# Patient Record
Sex: FEMALE | Race: WHITE | NOT HISPANIC OR LATINO | Employment: FULL TIME | URBAN - METROPOLITAN AREA
[De-identification: names, ages, dates, MRNs, and addresses within clinical notes are randomized per-mention and may not be internally consistent; named-entity substitution may affect disease eponyms.]

---

## 2018-10-25 ENCOUNTER — OFFICE VISIT CONVERTED (OUTPATIENT)
Dept: FAMILY MEDICINE CLINIC | Facility: CLINIC | Age: 53
End: 2018-10-25
Attending: FAMILY MEDICINE

## 2019-05-06 ENCOUNTER — OFFICE VISIT CONVERTED (OUTPATIENT)
Dept: FAMILY MEDICINE CLINIC | Facility: CLINIC | Age: 54
End: 2019-05-06
Attending: FAMILY MEDICINE

## 2019-05-07 ENCOUNTER — HOSPITAL ENCOUNTER (OUTPATIENT)
Dept: FAMILY MEDICINE CLINIC | Facility: CLINIC | Age: 54
Discharge: HOME | End: 2019-05-07
Attending: FAMILY MEDICINE

## 2019-05-07 LAB
BASOPHILS # BLD AUTO: 0.03 10*3/UL (ref 0–0.2)
BASOPHILS NFR BLD AUTO: 0.5 % (ref 0–3)
CONV ABS IMM GRAN: 0.02 10*3/UL (ref 0–0.2)
CONV IMMATURE GRAN: 0.3 % (ref 0–1.8)
DEPRECATED RDW RBC AUTO: 45.3 FL (ref 36.4–46.3)
EOSINOPHIL # BLD AUTO: 0.1 10*3/UL (ref 0–0.7)
EOSINOPHIL # BLD AUTO: 1.7 % (ref 0–7)
ERYTHROCYTE [DISTWIDTH] IN BLOOD BY AUTOMATED COUNT: 13.2 % (ref 11.7–14.4)
HBA1C MFR BLD: 12.6 G/DL (ref 12–16)
HCT VFR BLD AUTO: 40.2 % (ref 37–47)
LYMPHOCYTES # BLD AUTO: 1.56 10*3/UL (ref 1–5)
MCH RBC QN AUTO: 29.6 PG (ref 27–31)
MCHC RBC AUTO-ENTMCNC: 31.3 G/DL (ref 33–37)
MCV RBC AUTO: 94.4 FL (ref 81–99)
MONOCYTES # BLD AUTO: 0.48 10*3/UL (ref 0.2–1.2)
MONOCYTES NFR BLD AUTO: 8.1 % (ref 3–10)
NEUTROPHILS # BLD AUTO: 3.72 10*3/UL (ref 2–8)
NEUTROPHILS NFR BLD AUTO: 63 % (ref 30–85)
NRBC CBCN: 0 % (ref 0–0.7)
PLATELET # BLD AUTO: 210 10*3/UL (ref 130–400)
PMV BLD AUTO: 11.7 FL (ref 9.4–12.3)
RBC # BLD AUTO: 4.26 10*6/UL (ref 4.2–5.4)
VARIANT LYMPHS NFR BLD MANUAL: 26.4 % (ref 20–45)
WBC # BLD AUTO: 5.91 10*3/UL (ref 4.8–10.8)

## 2019-05-08 LAB
ALBUMIN SERPL-MCNC: 4.3 G/DL (ref 3.5–5)
ALBUMIN/GLOB SERPL: 1.5 {RATIO} (ref 1.4–2.6)
ALP SERPL-CCNC: 71 U/L (ref 53–141)
ALT SERPL-CCNC: 27 U/L (ref 10–40)
ANION GAP SERPL CALC-SCNC: 20 MMOL/L (ref 8–19)
AST SERPL-CCNC: 35 U/L (ref 15–50)
BILIRUB SERPL-MCNC: 0.52 MG/DL (ref 0.2–1.3)
BUN SERPL-MCNC: 8 MG/DL (ref 5–25)
BUN/CREAT SERPL: 11 {RATIO} (ref 6–20)
CALCIUM SERPL-MCNC: 9.4 MG/DL (ref 8.7–10.4)
CHLORIDE SERPL-SCNC: 97 MMOL/L (ref 99–111)
CHOLEST SERPL-MCNC: 130 MG/DL (ref 107–200)
CHOLEST/HDLC SERPL: 3.2 {RATIO} (ref 3–6)
CONV CO2: 26 MMOL/L (ref 22–32)
CONV TOTAL PROTEIN: 7.2 G/DL (ref 6.3–8.2)
CREAT UR-MCNC: 0.72 MG/DL (ref 0.5–0.9)
EST. AVERAGE GLUCOSE BLD GHB EST-MCNC: 111 MG/DL
GFR SERPLBLD BASED ON 1.73 SQ M-ARVRAT: >60 ML/MIN/{1.73_M2}
GLOBULIN UR ELPH-MCNC: 2.9 G/DL (ref 2–3.5)
GLUCOSE SERPL-MCNC: 94 MG/DL (ref 65–99)
HBA1C MFR BLD: 5.5 % (ref 3.5–5.7)
HDLC SERPL-MCNC: 41 MG/DL (ref 40–60)
LDLC SERPL CALC-MCNC: 53 MG/DL (ref 70–100)
OSMOLALITY SERPL CALC.SUM OF ELEC: 284 MOSM/KG (ref 273–304)
POTASSIUM SERPL-SCNC: 4.5 MMOL/L (ref 3.5–5.3)
SODIUM SERPL-SCNC: 138 MMOL/L (ref 135–147)
T4 FREE SERPL-MCNC: 1 NG/DL (ref 0.9–1.8)
TRIGL SERPL-MCNC: 180 MG/DL (ref 40–150)
TSH SERPL-ACNC: 2.25 M[IU]/L (ref 0.27–4.2)
VLDLC SERPL-MCNC: 36 MG/DL (ref 5–37)

## 2019-10-02 ENCOUNTER — CONVERSION ENCOUNTER (OUTPATIENT)
Dept: FAMILY MEDICINE CLINIC | Facility: CLINIC | Age: 54
End: 2019-10-02

## 2019-10-02 ENCOUNTER — OFFICE VISIT CONVERTED (OUTPATIENT)
Dept: FAMILY MEDICINE CLINIC | Facility: CLINIC | Age: 54
End: 2019-10-02
Attending: NURSE PRACTITIONER

## 2019-11-06 ENCOUNTER — OFFICE VISIT CONVERTED (OUTPATIENT)
Dept: FAMILY MEDICINE CLINIC | Facility: CLINIC | Age: 54
End: 2019-11-06
Attending: FAMILY MEDICINE

## 2019-12-09 ENCOUNTER — OFFICE VISIT CONVERTED (OUTPATIENT)
Dept: FAMILY MEDICINE CLINIC | Facility: CLINIC | Age: 54
End: 2019-12-09
Attending: FAMILY MEDICINE

## 2019-12-09 ENCOUNTER — CONVERSION ENCOUNTER (OUTPATIENT)
Dept: FAMILY MEDICINE CLINIC | Facility: CLINIC | Age: 54
End: 2019-12-09

## 2019-12-13 ENCOUNTER — HOSPITAL ENCOUNTER (OUTPATIENT)
Dept: URGENT CARE | Facility: CLINIC | Age: 54
Discharge: HOME | End: 2019-12-13
Attending: EMERGENCY MEDICINE

## 2020-02-06 ENCOUNTER — OFFICE VISIT CONVERTED (OUTPATIENT)
Dept: FAMILY MEDICINE CLINIC | Facility: CLINIC | Age: 55
End: 2020-02-06
Attending: FAMILY MEDICINE

## 2020-05-21 ENCOUNTER — OFFICE VISIT CONVERTED (OUTPATIENT)
Dept: FAMILY MEDICINE CLINIC | Facility: CLINIC | Age: 55
End: 2020-05-21
Attending: FAMILY MEDICINE

## 2020-05-21 ENCOUNTER — HOSPITAL ENCOUNTER (OUTPATIENT)
Dept: FAMILY MEDICINE CLINIC | Facility: CLINIC | Age: 55
Discharge: HOME | End: 2020-05-21
Attending: FAMILY MEDICINE

## 2020-08-25 ENCOUNTER — OFFICE VISIT CONVERTED (OUTPATIENT)
Dept: FAMILY MEDICINE CLINIC | Facility: CLINIC | Age: 55
End: 2020-08-25
Attending: FAMILY MEDICINE

## 2021-05-13 NOTE — PROGRESS NOTES
Progress Note      Patient Name: Neda Bah   Patient ID: [id number]   Sex: Female   YOB: 1965    Primary Care Provider: Osmani Mays DO   Referring Provider: Osmani Mays DO    Visit Date: August 25, 2020    Provider: Osmani Mays DO   Location: [facility]   Location Address: [address] [place] [address], [address]  [id number]   Location Phone: [phone]          Chief Complaint  · check up      History Of Present Illness  Neda Bah is a 54 year old /White female who presents for evaluation and treatment of:      Patient presents for 3-month checkup.  She has lost 2 pounds since I last saw her.  She is being treated for obesity.  BMI has continued to improve down to 28.7.  Her BMI when she first started treatment was 34.27.  She is still exercising regularly.  Her bunions have been bothering her more more so on the right side than the left side.  She is requesting referral to podiatry.  She has lost a total of 28 pounds.  Heart rate slightly elevated today.  She denies any palpitations or racing heart rate.  She is requesting a refill of phentermine.  Her Roman, UDS, and controlled substance agreement have been reviewed and they are appropriate.       Past Medical History  Allergies; Breast cancer screening; Cervical cancer screening; Colon cancer screening; Hemorrhoids; Hyperlipidemia; Hypertension; Reflux Disease; Screening Mammogram; Vasomotor symptoms due to menopause         Past Surgical History  Colonoscopy; Diverticulitis surgery; Hysterectomy         Medication List  atorvastatin 40 mg oral tablet; cimetidine 400 mg oral tablet; Flonase Allergy Relief 50 mcg/actuation nasal spray,suspension; lisinopril 10 mg oral tablet; phentermine 37.5 mg oral tablet; valacyclovir 500 mg oral tablet; Zyrtec 10 mg oral tablet         Allergy List  NO KNOWN DRUG ALLERGIES       Allergies Reconciled  Family Medical History  Liver Neoplasm, Malignant; Lung cancer;  Alcoholism in family; No family history of colorectal cancer         Social History  Alcohol (Unknown); Family History of Substance Use/Abuse; Tobacco (Former)         Immunizations  Name Date Admin   Influenza          Review of Systems     General: Denies any fever, chills.  Weight loss as discussed above  HEENT:  Denies any vision or hearing changes. Denies any neck tenderness. Denies any headaches. Denies nasal congestion  Cardiovascular: Denies any chest pain or palpitations  respiratory: Denies any cough or wheezing. Denies any shortness of breath  Gastrointestinal: Denies any nausea vomiting or diarrhea, Denies constipation  Extremities: Denies any edema  Psychiatric: Denies any changes in mood or affect  Neurologic: Denies any neurologic deficits  skin: Denies any rashes or lesions.  Musculoskeletal: As discussed above       Vitals  Date Time BP Position Site L\R Cuff Size HR RR TEMP (F) WT  HT  BMI kg/m2 BSA m2 O2 Sat HC       08/25/2020 02:42 /82 Sitting    104 - R  97.5 146lbs 16oz 5'   28.71 1.68 99 %          Physical Examination     General: AAO 3, no acute distress, pleasant  HEENT: Normocephalic, atraumatic  Cardiovascular: Regular rate and rhythm without appreciable murmur  Respiratory: Clear to auscultation bilaterally no RRW  Gastrointestinal: Soft nontender nondistended with bowel sounds present  extremities: No clubbing, cyanosis or edema  Neurologic: CN II through XII grossly intact   Psychiatric: Normal mood and affect  Musculoskeletal: Bunions noted bilaterally more prominent on the right than the left.  Nontender to palpation.           Assessment  · Bunion, left     727.1/M21.612  · Bunion, right     727.1/M21.611  · BMI 28.0-28.9,adult     V85.24/Z68.28    Problems Reconciled  Plan  · Orders  o ACO-39: Current medications updated and reviewed () - - 08/25/2020  o PODIATRY CONSULTATION (PODIA) - 727.1/M21.612, 727.1/M21.611 - 08/25/2020   Please evaluate and  treat  · Medications  o phentermine 37.5 mg oral tablet   SIG: take 1 tablet (37.5 mg) by oral route once daily before breakfast for 30 days   DISP: (30) tablets with 2 refills  Refilled on 08/25/2020     o Medications have been Reconciled  o Transition of Care or Provider Policy  · Instructions  o Discussed the risk and benefits of the use of controlled substances with the patient, including the risk of tolerance and drug dependence. The patient has been counseled on the need to have an exit strategy, including potentially discontinuing the use of controlled substances.   o Patient was educated/instructed on their diagnosis, treatment and medications prior to discharge from the clinic today.  o Patient instructed to seek medical attention urgently for new or worsening symptoms.  o Call the office with any concerns or questions.  o Discussed continue current weight management plan with phentermine. Roman, UDS, and controlled substance agreement have been reviewed and they are appropriate. I will see her back in 3 months. I will make referral to podiatry. Patient encouraged to continue doing diet and exercise in addition to phentermine. In November she will been on treatment for 1 year.  · Disposition  o Follow Up in 3 months.            Electronically Signed by: Osmani Mays, DO -Author on August 25, 2020 04:33:25 PM

## 2021-05-13 NOTE — PROGRESS NOTES
Progress Note      Patient Name: Neda Bah   Patient ID: [id number]   Sex: Female   YOB: 1965    Primary Care Provider: Osmani Mays DO   Referring Provider: Osmani Mays DO    Visit Date: May 21, 2020    Provider: Osmani Mays DO   Location: [facility]   Location Address: [address] [place] [address], [address]  [id number]   Location Phone: [phone]          Chief Complaint  · check up      History Of Present Illness  Neda Bah is a 54 year old /White female who presents for evaluation and treatment of:      Patient presents today for follow-up.  I have been tracking her weight loss.  She has been on phentermine for treatment of obesity.  She has lost another couple pounds.  During the coronavirus pandemic it was difficult for her to work out as a gyms were close but she is back at it now.  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  She does not need a refill of phentermine today.  She has been on treatment since November 2019.  She does have acid reflux and is requesting another medication besides Zantac as this has been removed from the market.  I will switch her to cimetidine.  Her blood pressure is elevated today as well as heart rate.  She admits to being aggravated while at work just before coming over here.  Her blood pressure otherwise has been running normal with normal heart rate at home.  Discussed having her monitor for now and if blood pressure continues to remain elevated she is to return as this can potentially be a side effect of phentermine.  She verbalized understanding.       Past Medical History  Allergies; Breast cancer screening; Cervical cancer screening; Colon cancer screening; Hemorrhoids; Hyperlipidemia; Hypertension; Reflux Disease; Screening Mammogram; Vasomotor symptoms due to menopause         Past Surgical History  Colonoscopy; Diverticulitis surgery; Hysterectomy         Medication  List  atorvastatin 40 mg oral tablet; Flonase Allergy Relief 50 mcg/actuation nasal spray,suspension; lisinopril 10 mg oral tablet; phentermine 37.5 mg oral tablet; Zyrtec 10 mg oral tablet         Allergy List  NO KNOWN DRUG ALLERGIES         Family Medical History  Liver Neoplasm, Malignant; Lung cancer; Alcoholism in family; No family history of colorectal cancer         Social History  Alcohol (Unknown); Family History of Substance Use/Abuse; Tobacco (Former)         Immunizations  Name Date Admin   Influenza          Review of Systems     General: Denies any fever, chills.  Slight weight loss of 2 pounds since last visit.  Overall she has lost 26 pounds since starting phentermine.  HEENT:  Denies any vision or hearing changes. Denies any neck tenderness. Denies any headaches. Denies nasal congestion.  She was previously diagnosed with herpes in her eye on the right.  She was seen by ophthalmology and placed on valacyclovir.  She is requesting a refill for suppressive treatment 500 mg daily.  Cardiovascular: Denies any chest pain or palpitations  respiratory: Denies any cough or wheezing. Denies any shortness of breath  Gastrointestinal: Denies any nausea vomiting or diarrhea, Denies constipation  Extremities: Denies any edema  Psychiatric: Denies any changes in mood or affect  Neurologic: Denies any neurologic deficits  skin: Denies any rashes or lesions.  endocrine: Denies any weight loss, fever, night sweats  Musculoskeletal: Denies any weakness       Vitals  Date Time BP Position Site L\R Cuff Size HR RR TEMP (F) WT  HT  BMI kg/m2 BSA m2 O2 Sat        05/21/2020 12:56 /98 Sitting    110 - R  98 149lbs 2oz 5'   29.12 1.69 99 %          Physical Examination     General: AAO 3, no acute distress, pleasant, overweight  HEENT: Normocephalic, atraumatic  Cardiovascular: Regular rate and rhythm without appreciable murmur  Respiratory: Clear to auscultation bilaterally no RRW  Gastrointestinal: Soft  nontender nondistended with bowel sounds present  extremities: No clubbing, cyanosis or edema  Neurologic: CN II through XII grossly intact   Psychiatric: Normal mood and affect           Assessment  · GERD (gastroesophageal reflux disease)     530.81/K21.9  · BMI 29.0-29.9,adult     V85.25/Z68.29  · Medication monitoring encounter     V58.83/Z51.81    Problems Reconciled  Plan  · Orders  o ACO-39: Current medications updated and reviewed () - - 05/21/2020  · Medications  o cimetidine 400 mg oral tablet   SIG: take 1 tablet (400 mg) by oral route once daily at bedtime for 90 days   DISP: (90) tablets with 3 refills  Prescribed on 05/21/2020     o valacyclovir 500 mg oral tablet   SIG: take 1 tablet (500 mg) by oral route once daily for 90 days   DISP: (90) tablets with 3 refills  Prescribed on 05/21/2020     o Medications have been Reconciled  o Transition of Care or Provider Policy  · Instructions  o Patient was educated/instructed on their diagnosis, treatment and medications prior to discharge from the clinic today.  o Patient instructed to seek medical attention urgently for new or worsening symptoms.  o Call the office with any concerns or questions.  o I will switch her to cimetidine. I will also give her prescription for valacyclovir. Plan on seeing patient back in 3 months for checkup. She is to call with any questions or concerns. She is not requesting a refill of phentermine today. We will continue with current management and make changes if needed if her blood pressure remains elevated. She has had normotensive levels since starting phentermine.  · Disposition  o Follow Up in 3 months.            Electronically Signed by: Osmani Mays DO -Author on May 21, 2020 01:14:17 PM

## 2021-05-14 VITALS
OXYGEN SATURATION: 99 % | SYSTOLIC BLOOD PRESSURE: 134 MMHG | DIASTOLIC BLOOD PRESSURE: 82 MMHG | TEMPERATURE: 97.5 F | BODY MASS INDEX: 28.86 KG/M2 | HEIGHT: 60 IN | HEART RATE: 104 BPM | WEIGHT: 147 LBS

## 2021-05-15 VITALS
OXYGEN SATURATION: 99 % | TEMPERATURE: 97.9 F | HEART RATE: 83 BPM | SYSTOLIC BLOOD PRESSURE: 124 MMHG | BODY MASS INDEX: 29.7 KG/M2 | HEIGHT: 60 IN | DIASTOLIC BLOOD PRESSURE: 78 MMHG | WEIGHT: 151.25 LBS

## 2021-05-15 VITALS
BODY MASS INDEX: 34.26 KG/M2 | WEIGHT: 174.5 LBS | SYSTOLIC BLOOD PRESSURE: 124 MMHG | OXYGEN SATURATION: 97 % | HEIGHT: 60 IN | HEART RATE: 97 BPM | DIASTOLIC BLOOD PRESSURE: 78 MMHG | TEMPERATURE: 97.9 F

## 2021-05-15 VITALS
OXYGEN SATURATION: 97 % | HEIGHT: 60 IN | SYSTOLIC BLOOD PRESSURE: 112 MMHG | TEMPERATURE: 98.4 F | DIASTOLIC BLOOD PRESSURE: 78 MMHG | RESPIRATION RATE: 16 BRPM | WEIGHT: 163.5 LBS | HEART RATE: 101 BPM | BODY MASS INDEX: 32.1 KG/M2

## 2021-05-15 VITALS
DIASTOLIC BLOOD PRESSURE: 98 MMHG | BODY MASS INDEX: 29.28 KG/M2 | WEIGHT: 149.12 LBS | HEIGHT: 60 IN | SYSTOLIC BLOOD PRESSURE: 160 MMHG | TEMPERATURE: 98 F | HEART RATE: 110 BPM | OXYGEN SATURATION: 99 %

## 2021-05-15 VITALS
HEART RATE: 70 BPM | WEIGHT: 175.5 LBS | TEMPERATURE: 98 F | HEIGHT: 60 IN | DIASTOLIC BLOOD PRESSURE: 78 MMHG | BODY MASS INDEX: 34.46 KG/M2 | SYSTOLIC BLOOD PRESSURE: 122 MMHG | OXYGEN SATURATION: 96 %

## 2021-05-15 VITALS
BODY MASS INDEX: 35.14 KG/M2 | TEMPERATURE: 97.9 F | WEIGHT: 179 LBS | HEART RATE: 87 BPM | HEIGHT: 60 IN | DIASTOLIC BLOOD PRESSURE: 80 MMHG | SYSTOLIC BLOOD PRESSURE: 112 MMHG

## 2021-05-16 VITALS
HEIGHT: 60 IN | HEART RATE: 87 BPM | BODY MASS INDEX: 35.53 KG/M2 | SYSTOLIC BLOOD PRESSURE: 122 MMHG | WEIGHT: 181 LBS | DIASTOLIC BLOOD PRESSURE: 88 MMHG | TEMPERATURE: 98.5 F | OXYGEN SATURATION: 97 %

## 2021-07-12 ENCOUNTER — TELEPHONE (OUTPATIENT)
Dept: FAMILY MEDICINE CLINIC | Facility: CLINIC | Age: 56
End: 2021-07-12

## 2021-07-12 RX ORDER — ATORVASTATIN CALCIUM 40 MG/1
TABLET, FILM COATED ORAL
COMMUNITY
Start: 2021-04-29 | End: 2022-02-02 | Stop reason: SDUPTHER

## 2021-07-12 RX ORDER — CETIRIZINE HYDROCHLORIDE 10 MG/1
TABLET ORAL
COMMUNITY
Start: 2021-02-01 | End: 2022-05-02 | Stop reason: SDUPTHER

## 2021-07-12 RX ORDER — LISINOPRIL 10 MG/1
TABLET ORAL
COMMUNITY
Start: 2021-04-29 | End: 2022-03-02 | Stop reason: SDUPTHER

## 2021-07-12 RX ORDER — LEVOTHYROXINE SODIUM 0.03 MG/1
TABLET ORAL
COMMUNITY
End: 2022-02-02

## 2021-07-12 RX ORDER — VALACYCLOVIR HYDROCHLORIDE 500 MG/1
TABLET, FILM COATED ORAL
COMMUNITY
Start: 2021-05-19 | End: 2022-04-11 | Stop reason: SDUPTHER

## 2021-07-12 RX ORDER — CIMETIDINE 400 MG/1
TABLET, FILM COATED ORAL
Qty: 90 TABLET | Refills: 3 | Status: SHIPPED | OUTPATIENT
Start: 2021-07-12 | End: 2022-04-11 | Stop reason: SDUPTHER

## 2021-07-12 NOTE — TELEPHONE ENCOUNTER
Caller: Neda Bah    Relationship: Self    Best call back number: [phone]    What medication are you requesting:   CIMETIDINE 400 MG    Have you had these symptoms before:    [x] Yes  [] No    Have you been treated for these symptoms before:   [x] Yes  [] No    If a prescription is needed, what is your preferred pharmacy and phone number:    [facility]  [address], [place]     Additional notes: NO MED LIST ON FILE, PATIENT HAS FOUR DAYS LEFT. PLEASE CALL PATIENT IF SHE NEEDS AN APPT FOR REFILLS.

## 2022-01-31 ENCOUNTER — TELEPHONE (OUTPATIENT)
Dept: FAMILY MEDICINE CLINIC | Facility: CLINIC | Age: 57
End: 2022-01-31

## 2022-01-31 RX ORDER — ATORVASTATIN CALCIUM 40 MG/1
TABLET, FILM COATED ORAL
OUTPATIENT
Start: 2022-01-31

## 2022-01-31 NOTE — TELEPHONE ENCOUNTER
Caller: Neda Bah    Relationship: Self    Best call back number: [phone]    Requested Prescriptions:   Requested Prescriptions     Pending Prescriptions Disp Refills   • atorvastatin (LIPITOR) 40 MG tablet          Pharmacy where request should be sent: [facility] [place] [facility] [place] - [address] [facility] - [phone] [facility] [phone] FX     Additional details provided by patient: PATIENT HAS ONE PILL LEFT  Does the patient have less than a 3 day supply:  [x] Yes  [] No    Debora Gutierrez Rep   01/31/22 08:18 EST

## 2022-02-02 ENCOUNTER — OFFICE VISIT (OUTPATIENT)
Dept: FAMILY MEDICINE CLINIC | Facility: CLINIC | Age: 57
End: 2022-02-02

## 2022-02-02 VITALS
OXYGEN SATURATION: 98 % | SYSTOLIC BLOOD PRESSURE: 128 MMHG | HEIGHT: 60 IN | TEMPERATURE: 98.2 F | DIASTOLIC BLOOD PRESSURE: 82 MMHG | WEIGHT: 167.4 LBS | BODY MASS INDEX: 32.86 KG/M2 | HEART RATE: 94 BPM

## 2022-02-02 DIAGNOSIS — B00.1 RECURRENT COLD SORES: ICD-10-CM

## 2022-02-02 DIAGNOSIS — K21.9 GASTROESOPHAGEAL REFLUX DISEASE, UNSPECIFIED WHETHER ESOPHAGITIS PRESENT: ICD-10-CM

## 2022-02-02 DIAGNOSIS — E55.9 VITAMIN D DEFICIENCY: ICD-10-CM

## 2022-02-02 DIAGNOSIS — E78.2 MIXED HYPERLIPIDEMIA: ICD-10-CM

## 2022-02-02 DIAGNOSIS — Z51.81 MEDICATION MONITORING ENCOUNTER: ICD-10-CM

## 2022-02-02 DIAGNOSIS — E03.9 HYPOTHYROIDISM, UNSPECIFIED TYPE: ICD-10-CM

## 2022-02-02 DIAGNOSIS — Z12.31 VISIT FOR SCREENING MAMMOGRAM: ICD-10-CM

## 2022-02-02 DIAGNOSIS — I10 PRIMARY HYPERTENSION: Primary | ICD-10-CM

## 2022-02-02 LAB
25(OH)D3 SERPL-MCNC: 32.3 NG/ML (ref 30–100)
ALBUMIN SERPL-MCNC: 5.1 G/DL (ref 3.5–5.2)
ALBUMIN/GLOB SERPL: 2.1 G/DL
ALP SERPL-CCNC: 74 U/L (ref 39–117)
ALT SERPL W P-5'-P-CCNC: 33 U/L (ref 1–33)
ANION GAP SERPL CALCULATED.3IONS-SCNC: 12.5 MMOL/L (ref 5–15)
AST SERPL-CCNC: 30 U/L (ref 1–32)
BASOPHILS # BLD AUTO: 0.02 10*3/MM3 (ref 0–0.2)
BASOPHILS NFR BLD AUTO: 0.3 % (ref 0–1.5)
BILIRUB SERPL-MCNC: 0.6 MG/DL (ref 0–1.2)
BUN SERPL-MCNC: 10 MG/DL (ref 6–20)
BUN/CREAT SERPL: 12.8 (ref 7–25)
CALCIUM SPEC-SCNC: 10.3 MG/DL (ref 8.6–10.5)
CHLORIDE SERPL-SCNC: 95 MMOL/L (ref 98–107)
CHOLEST SERPL-MCNC: 189 MG/DL (ref 0–200)
CO2 SERPL-SCNC: 28.5 MMOL/L (ref 22–29)
CREAT SERPL-MCNC: 0.78 MG/DL (ref 0.57–1)
DEPRECATED RDW RBC AUTO: 43.8 FL (ref 37–54)
EOSINOPHIL # BLD AUTO: 0.09 10*3/MM3 (ref 0–0.4)
EOSINOPHIL NFR BLD AUTO: 1.4 % (ref 0.3–6.2)
ERYTHROCYTE [DISTWIDTH] IN BLOOD BY AUTOMATED COUNT: 13 % (ref 12.3–15.4)
GFR SERPL CREATININE-BSD FRML MDRD: 77 ML/MIN/1.73
GLOBULIN UR ELPH-MCNC: 2.4 GM/DL
GLUCOSE SERPL-MCNC: 87 MG/DL (ref 65–99)
HCT VFR BLD AUTO: 40.7 % (ref 34–46.6)
HDLC SERPL-MCNC: 56 MG/DL (ref 40–60)
HGB BLD-MCNC: 13.7 G/DL (ref 12–15.9)
IMM GRANULOCYTES # BLD AUTO: 0.03 10*3/MM3 (ref 0–0.05)
IMM GRANULOCYTES NFR BLD AUTO: 0.5 % (ref 0–0.5)
LDLC SERPL CALC-MCNC: 93 MG/DL (ref 0–100)
LDLC/HDLC SERPL: 1.51 {RATIO}
LYMPHOCYTES # BLD AUTO: 1.86 10*3/MM3 (ref 0.7–3.1)
LYMPHOCYTES NFR BLD AUTO: 29.7 % (ref 19.6–45.3)
MCH RBC QN AUTO: 31 PG (ref 26.6–33)
MCHC RBC AUTO-ENTMCNC: 33.7 G/DL (ref 31.5–35.7)
MCV RBC AUTO: 92.1 FL (ref 79–97)
MONOCYTES # BLD AUTO: 0.57 10*3/MM3 (ref 0.1–0.9)
MONOCYTES NFR BLD AUTO: 9.1 % (ref 5–12)
NEUTROPHILS NFR BLD AUTO: 3.69 10*3/MM3 (ref 1.7–7)
NEUTROPHILS NFR BLD AUTO: 59 % (ref 42.7–76)
NRBC BLD AUTO-RTO: 0 /100 WBC (ref 0–0.2)
PLATELET # BLD AUTO: 223 10*3/MM3 (ref 140–450)
PMV BLD AUTO: 11.1 FL (ref 6–12)
POTASSIUM SERPL-SCNC: 4 MMOL/L (ref 3.5–5.2)
PROT SERPL-MCNC: 7.5 G/DL (ref 6–8.5)
RBC # BLD AUTO: 4.42 10*6/MM3 (ref 3.77–5.28)
SODIUM SERPL-SCNC: 136 MMOL/L (ref 136–145)
T4 FREE SERPL-MCNC: 1.01 NG/DL (ref 0.93–1.7)
TRIGL SERPL-MCNC: 242 MG/DL (ref 0–150)
TSH SERPL DL<=0.05 MIU/L-ACNC: 1.85 UIU/ML (ref 0.27–4.2)
VLDLC SERPL-MCNC: 40 MG/DL (ref 5–40)
WBC NRBC COR # BLD: 6.26 10*3/MM3 (ref 3.4–10.8)

## 2022-02-02 PROCEDURE — 85025 COMPLETE CBC W/AUTO DIFF WBC: CPT | Performed by: FAMILY MEDICINE

## 2022-02-02 PROCEDURE — 84443 ASSAY THYROID STIM HORMONE: CPT | Performed by: FAMILY MEDICINE

## 2022-02-02 PROCEDURE — 84439 ASSAY OF FREE THYROXINE: CPT | Performed by: FAMILY MEDICINE

## 2022-02-02 PROCEDURE — 80061 LIPID PANEL: CPT | Performed by: FAMILY MEDICINE

## 2022-02-02 PROCEDURE — 82306 VITAMIN D 25 HYDROXY: CPT | Performed by: FAMILY MEDICINE

## 2022-02-02 PROCEDURE — 80053 COMPREHEN METABOLIC PANEL: CPT | Performed by: FAMILY MEDICINE

## 2022-02-02 PROCEDURE — 99214 OFFICE O/P EST MOD 30 MIN: CPT | Performed by: FAMILY MEDICINE

## 2022-02-02 RX ORDER — ATORVASTATIN CALCIUM 40 MG/1
40 TABLET, FILM COATED ORAL DAILY
Qty: 90 TABLET | Refills: 3 | Status: SHIPPED | OUTPATIENT
Start: 2022-02-02 | End: 2022-03-01 | Stop reason: SDUPTHER

## 2022-02-02 NOTE — PROGRESS NOTES
"Chief Complaint  Hypertension  Hyperlipidemia  Medication refill  Requesting labs    Subjective          Neda Bah presents to [facility]  History of Present Illness  Patient presents today for follow-up for hypertension and hyperlipidemia.  She was previously on vitamin D supplementation.  She has not been on this lately.  She is due to have routine labs done.  She reports compliance with atorvastatin 40 mg and lisinopril 10 mg.  She is requesting refills today.  I last saw her for an appointment on 8/25/2020.  She had lost weight at that time taking phentermine however now her weight is up again.  Her BMI is 32.69.  It previously was as high as 34.27.  We discussed lifestyle changes including diet and exercise.  She admits she has not been exercising regularly.  She is due for mammogram so I discussed ordering this as well.  She was previously treated for hypothyroidism however when I checked her lab work back on 5/7/2019 her thyroid levels were normal off medication.  Plan to reassess today.  Objective   Vital Signs:   /82   Pulse 94   Temp 98.2 °F (36.8 °C)   Ht 152.4 cm (60\")   Wt 75.9 kg (167 lb 6.4 oz)   SpO2 98%   BMI 32.69 kg/m²     Physical Exam   General: AAO ×3, no acute distress, pleasant, obese  HEENT: Normocephalic, atraumatic  Cardiovascular: Regular rate and rhythm without appreciable murmur  Respiratory: Clear to auscultation bilaterally no RRW  Gastrointestinal: Soft nontender nondistended with bowel sounds present  extremities: No edema  Neurologic: CN II through XII grossly intact   Psychiatric: Normal mood and affect  Result Review :                 Assessment and Plan    Diagnoses and all orders for this visit:    1. Primary hypertension (Primary)    2. BMI 32.0-32.9,adult    3. Visit for screening mammogram  -     Mammo Screening Digital Tomosynthesis Bilateral With CAD; Future    4. Mixed hyperlipidemia  -     Lipid Panel    5. Medication " monitoring encounter  -     CBC & Differential  -     Comprehensive Metabolic Panel  -     Lipid Panel  -     TSH+Free T4  -     Vitamin D 25 Hydroxy    6. Hypothyroidism, unspecified type  -     CBC & Differential  -     Comprehensive Metabolic Panel  -     TSH+Free T4    7. Vitamin D deficiency  -     Vitamin D 25 Hydroxy    8. Gastroesophageal reflux disease, unspecified whether esophagitis present    9. Recurrent cold sores    Other orders  -     atorvastatin (LIPITOR) 40 MG tablet; Take 1 tablet by mouth Daily.  Dispense: 90 tablet; Refill: 3    Patient has a BMI of 32.69 placing her in obesity class I.  I discussed with her lifestyle modification including diet and exercise.  Discussed getting labs done today.  I will see patient back in 6 months or sooner if needed.  She is instructed to call with any questions or concerns.  I discussed with her continue current management of hypertension hyperlipidemia.  She is also taking cimetidine for gastroesophageal reflux.  Symptoms have been under good control.    Follow Up   Return in about 6 months (around 8/2/2022) for hypertension.  Patient was given instructions and counseling regarding her condition or for health maintenance advice. Please see specific information pulled into the AVS if appropriate.

## 2022-02-28 ENCOUNTER — TELEPHONE (OUTPATIENT)
Dept: FAMILY MEDICINE CLINIC | Facility: CLINIC | Age: 57
End: 2022-02-28

## 2022-02-28 RX ORDER — LISINOPRIL 10 MG/1
TABLET ORAL
Status: CANCELLED | OUTPATIENT
Start: 2022-02-28

## 2022-02-28 NOTE — TELEPHONE ENCOUNTER
Caller: Neda Bah    Relationship: Self    Best call back number: [phone]    Requested Prescriptions:   Requested Prescriptions     Pending Prescriptions Disp Refills   • lisinopril (PRINIVIL,ZESTRIL) 10 MG tablet          Ph[facility] be sent[facility] [place] [place] [place] - [address] [facility] - [phone]  - [phone] FX     Additional details provided by patient: PATIENT HAS FOUR PILLS LEFT. PLEASE CALL PATIENT WHEN SCRIPT IS SENT TO THE PHARMACY.    Does the patient have less than a 3 day supply:  [] Yes  [x] No    Debora Perea Rep   02/28/22 09:51 EST

## 2022-03-01 ENCOUNTER — PATIENT MESSAGE (OUTPATIENT)
Dept: FAMILY MEDICINE CLINIC | Facility: CLINIC | Age: 57
End: 2022-03-01

## 2022-03-01 RX ORDER — ATORVASTATIN CALCIUM 40 MG/1
40 TABLET, FILM COATED ORAL DAILY
Qty: 90 TABLET | Refills: 3 | Status: SHIPPED | OUTPATIENT
Start: 2022-03-01 | End: 2022-12-06 | Stop reason: SDUPTHER

## 2022-03-02 RX ORDER — LISINOPRIL 10 MG/1
10 TABLET ORAL DAILY
Qty: 30 TABLET | Refills: 3 | Status: SHIPPED | OUTPATIENT
Start: 2022-03-02 | End: 2022-03-04

## 2022-03-04 RX ORDER — LISINOPRIL 10 MG/1
10 TABLET ORAL DAILY
Qty: 90 TABLET | Refills: 3 | Status: SHIPPED | OUTPATIENT
Start: 2022-03-04 | End: 2022-06-30 | Stop reason: SDUPTHER

## 2022-04-07 ENCOUNTER — APPOINTMENT (OUTPATIENT)
Dept: MAMMOGRAPHY | Facility: HOSPITAL | Age: 57
End: 2022-04-07

## 2022-04-11 RX ORDER — VALACYCLOVIR HYDROCHLORIDE 500 MG/1
500 TABLET, FILM COATED ORAL DAILY
Qty: 30 TABLET | Refills: 3 | Status: SHIPPED | OUTPATIENT
Start: 2022-04-11 | End: 2022-09-13 | Stop reason: SDUPTHER

## 2022-04-11 RX ORDER — CIMETIDINE 400 MG/1
TABLET, FILM COATED ORAL
Qty: 90 TABLET | Refills: 3 | Status: SHIPPED | OUTPATIENT
Start: 2022-04-11 | End: 2022-06-30 | Stop reason: SDUPTHER

## 2022-05-02 RX ORDER — CETIRIZINE HYDROCHLORIDE 10 MG/1
10 TABLET ORAL DAILY
Qty: 90 TABLET | Refills: 1 | Status: SHIPPED | OUTPATIENT
Start: 2022-05-02 | End: 2022-11-14 | Stop reason: SDUPTHER

## 2022-06-30 RX ORDER — LISINOPRIL 10 MG/1
10 TABLET ORAL DAILY
Qty: 90 TABLET | Refills: 3 | Status: SHIPPED | OUTPATIENT
Start: 2022-06-30 | End: 2022-12-06 | Stop reason: SDUPTHER

## 2022-06-30 RX ORDER — CIMETIDINE 400 MG/1
TABLET, FILM COATED ORAL
Qty: 90 TABLET | Refills: 3 | Status: SHIPPED | OUTPATIENT
Start: 2022-06-30 | End: 2022-12-06 | Stop reason: SDUPTHER

## 2022-09-13 RX ORDER — VALACYCLOVIR HYDROCHLORIDE 500 MG/1
500 TABLET, FILM COATED ORAL DAILY
Qty: 30 TABLET | Refills: 3 | Status: SHIPPED | OUTPATIENT
Start: 2022-09-13 | End: 2022-12-06 | Stop reason: SDUPTHER

## 2022-11-13 RX ORDER — CETIRIZINE HYDROCHLORIDE 10 MG/1
10 TABLET ORAL DAILY
Qty: 90 TABLET | Refills: 1 | Status: CANCELLED | OUTPATIENT
Start: 2022-11-13

## 2022-11-14 RX ORDER — CETIRIZINE HYDROCHLORIDE 10 MG/1
10 TABLET ORAL DAILY
Qty: 90 TABLET | Refills: 1 | Status: SHIPPED | OUTPATIENT
Start: 2022-11-14 | End: 2022-12-06 | Stop reason: SDUPTHER

## 2022-11-18 ENCOUNTER — TELEPHONE (OUTPATIENT)
Dept: FAMILY MEDICINE CLINIC | Facility: CLINIC | Age: 57
End: 2022-11-18

## 2022-11-18 NOTE — TELEPHONE ENCOUNTER
Attempted to contact patient to schedule follow up appointment as requested by provider to receive further medication refills. Left voicemail to call back

## 2022-12-06 ENCOUNTER — OFFICE VISIT (OUTPATIENT)
Dept: FAMILY MEDICINE CLINIC | Facility: CLINIC | Age: 57
End: 2022-12-06

## 2022-12-06 VITALS
DIASTOLIC BLOOD PRESSURE: 68 MMHG | TEMPERATURE: 98 F | BODY MASS INDEX: 33.51 KG/M2 | HEIGHT: 60 IN | HEART RATE: 112 BPM | OXYGEN SATURATION: 100 % | WEIGHT: 170.7 LBS | SYSTOLIC BLOOD PRESSURE: 134 MMHG

## 2022-12-06 DIAGNOSIS — H53.9 VISION CHANGES: ICD-10-CM

## 2022-12-06 DIAGNOSIS — K21.9 GASTROESOPHAGEAL REFLUX DISEASE, UNSPECIFIED WHETHER ESOPHAGITIS PRESENT: ICD-10-CM

## 2022-12-06 DIAGNOSIS — G89.29 CHRONIC PAIN OF LEFT KNEE: ICD-10-CM

## 2022-12-06 DIAGNOSIS — M25.562 CHRONIC PAIN OF LEFT KNEE: ICD-10-CM

## 2022-12-06 DIAGNOSIS — B00.1 RECURRENT COLD SORES: ICD-10-CM

## 2022-12-06 DIAGNOSIS — E55.9 VITAMIN D DEFICIENCY: ICD-10-CM

## 2022-12-06 DIAGNOSIS — J30.9 ALLERGIC RHINITIS, UNSPECIFIED SEASONALITY, UNSPECIFIED TRIGGER: ICD-10-CM

## 2022-12-06 DIAGNOSIS — E78.2 MIXED HYPERLIPIDEMIA: ICD-10-CM

## 2022-12-06 DIAGNOSIS — Z12.31 VISIT FOR SCREENING MAMMOGRAM: ICD-10-CM

## 2022-12-06 DIAGNOSIS — H02.834 DERMATOCHALASIS OF BOTH UPPER EYELIDS: ICD-10-CM

## 2022-12-06 DIAGNOSIS — H02.831 DERMATOCHALASIS OF BOTH UPPER EYELIDS: ICD-10-CM

## 2022-12-06 DIAGNOSIS — I10 PRIMARY HYPERTENSION: Primary | ICD-10-CM

## 2022-12-06 PROCEDURE — 99214 OFFICE O/P EST MOD 30 MIN: CPT | Performed by: FAMILY MEDICINE

## 2022-12-06 RX ORDER — CETIRIZINE HYDROCHLORIDE 10 MG/1
10 TABLET ORAL DAILY
Qty: 90 TABLET | Refills: 3 | Status: SHIPPED | OUTPATIENT
Start: 2022-12-06

## 2022-12-06 RX ORDER — CIMETIDINE 400 MG/1
TABLET, FILM COATED ORAL
Qty: 90 TABLET | Refills: 3 | Status: SHIPPED | OUTPATIENT
Start: 2022-12-06

## 2022-12-06 RX ORDER — FLUTICASONE PROPIONATE 50 MCG
2 SPRAY, SUSPENSION (ML) NASAL DAILY
Qty: 48 G | Refills: 3 | Status: SHIPPED | OUTPATIENT
Start: 2022-12-06

## 2022-12-06 RX ORDER — VALACYCLOVIR HYDROCHLORIDE 500 MG/1
500 TABLET, FILM COATED ORAL DAILY
Qty: 90 TABLET | Refills: 3 | Status: SHIPPED | OUTPATIENT
Start: 2022-12-06

## 2022-12-06 RX ORDER — LISINOPRIL 10 MG/1
10 TABLET ORAL DAILY
Qty: 90 TABLET | Refills: 3 | Status: SHIPPED | OUTPATIENT
Start: 2022-12-06

## 2022-12-06 RX ORDER — ATORVASTATIN CALCIUM 40 MG/1
40 TABLET, FILM COATED ORAL DAILY
Qty: 90 TABLET | Refills: 3 | Status: SHIPPED | OUTPATIENT
Start: 2022-12-06

## 2022-12-06 NOTE — PROGRESS NOTES
"Chief Complaint  Left knee pain  hld  Hypertension    Subjective        Neda Bah presents to [facility]  History of Present Illness  Patient presents today to discuss left knee pain.  This has been an issue for her for the past couple months.  She denies any injury.  She reports that she can bear weight onto her left knee because of the pain that she experiences.  She is able to ambulate.  She is requesting refill of valacyclovir as she had some issues with recurrent herpes of the right eye.  She has been experiencing some issues with her vision as well.  She states that the extra skin above her right eyelid makes it is where she cannot see completely.  She has had some issues with her vision as well and would like to have further evaluation.  Blood pressure has been adequately controlled.  She is currently taking lisinopril 10 mg.  Heart rate is elevated today on intake.  Repeat was regular rhythm but had 108 bpm.  She admits to having more stress at work recently due to someone that she has to work with.  She is taking atorvastatin for hyperlipidemia.  She will be due for labs when she returns for follow-up.  Her last lipid panel showed her LDL at 93 with triglycerides of 242.  Her last vitamin D level was adequate as well.  She is not currently on supplementation.  Objective   Vital Signs:  /68   Pulse 112   Temp 98 °F (36.7 °C)   Ht 152.4 cm (60\")   Wt 77.4 kg (170 lb 11.2 oz)   SpO2 100%   BMI 33.34 kg/m²   Estimated body mass index is 33.34 kg/m² as calculated from the following:    Height as of this encounter: 152.4 cm (60\").    Weight as of this encounter: 77.4 kg (170 lb 11.2 oz).          Physical Exam   General: AAO ×3, no acute distress, pleasant  HEENT: Normocephalic, atraumatic, dermatochalasis involving both upper eyelids  Cardiovascular: Regular rate and rhythm without appreciable murmur  Respiratory: Clear to auscultation bilaterally no " RRW  Gastrointestinal: Soft nontender nondistended with bowel sounds present  extremities: No edema  Neurologic: CN II through XII grossly intact   Psychiatric: Normal mood and affect  Result Review :                Assessment and Plan   Diagnoses and all orders for this visit:    1. Primary hypertension (Primary)  -     CBC & Differential; Future  -     Comprehensive Metabolic Panel; Future    2. Allergic rhinitis, unspecified seasonality, unspecified trigger    3. Recurrent cold sores    4. Gastroesophageal reflux disease, unspecified whether esophagitis present    5. Chronic pain of left knee  -     XR Knee 3 View Left; Future    6. Vision changes  -     Ambulatory Referral to Ophthalmology    7. Dermatochalasis of both upper eyelids  -     Ambulatory Referral to Ophthalmology    8. Visit for screening mammogram  -     Mammo Screening Digital Tomosynthesis Bilateral With CAD; Future    9. Mixed hyperlipidemia  -     Lipid Panel; Future    10. Vitamin D deficiency  -     Vitamin D,25-Hydroxy; Future    Other orders  -     fluticasone (Flonase) 50 MCG/ACT nasal spray; 2 sprays into the nostril(s) as directed by provider Daily.  Dispense: 48 g; Refill: 3  -     valACYclovir (VALTREX) 500 MG tablet; Take 1 tablet by mouth Daily.  Dispense: 90 tablet; Refill: 3  -     cetirizine (ZyrTEC Allergy) 10 MG tablet; Take 1 tablet by mouth Daily.  Dispense: 90 tablet; Refill: 3  -     lisinopril (PRINIVIL,ZESTRIL) 10 MG tablet; Take 1 tablet by mouth Daily.  Dispense: 90 tablet; Refill: 3  -     atorvastatin (LIPITOR) 40 MG tablet; Take 1 tablet by mouth Daily.  Dispense: 90 tablet; Refill: 3  -     cimetidine (TAGAMET) 400 MG tablet; Take 1 PO daily  Dispense: 90 tablet; Refill: 3  -     Diclofenac Sodium (VOLTAREN) 1 % gel gel; Apply 4 g topically to the appropriate area as directed 4 (Four) Times a Day As Needed (joint pain).  Dispense: 100 g; Refill: 1    I discussed with patient getting x-ray of the left knee for further  evaluation.  We discussed continuing current management for hypertension and hyperlipidemia.  I will refer her to ophthalmology in regards to her vision and her eyelids.  Plan to see her back in 6 months with labs done prior to next appointment.  Medications have been refilled today as requested.         Follow Up   Return in about 6 months (around 6/6/2023) for hypertension.  Patient was given instructions and counseling regarding her condition or for health maintenance advice. Please see specific information pulled into the AVS if appropriate.

## 2022-12-28 ENCOUNTER — TELEPHONE (OUTPATIENT)
Dept: FAMILY MEDICINE CLINIC | Facility: CLINIC | Age: 57
End: 2022-12-28

## 2022-12-28 DIAGNOSIS — H02.834 DERMATOCHALASIS OF BOTH UPPER EYELIDS: Primary | ICD-10-CM

## 2022-12-28 DIAGNOSIS — H02.9 EYELID ABNORMALITY: ICD-10-CM

## 2022-12-28 DIAGNOSIS — H02.831 DERMATOCHALASIS OF BOTH UPPER EYELIDS: Primary | ICD-10-CM

## 2022-12-28 NOTE — TELEPHONE ENCOUNTER
Patient is stating that she needs a referral for [facility]. To Dr Lara    I cant remember the Providers name that she said that was sending her to Dr. Lara.

## 2022-12-29 NOTE — TELEPHONE ENCOUNTER
Call placed to patient to request more information about the referral she is needing. Patient states that she is needing to be seen by Dr. Haskins for surgery on her eyelids as, per Dr. Waldrop, she is having overlap that is affecting her vision.

## 2023-02-08 ENCOUNTER — OFFICE VISIT (OUTPATIENT)
Dept: PLASTIC SURGERY | Facility: CLINIC | Age: 58
End: 2023-02-08
Payer: OTHER GOVERNMENT

## 2023-02-08 VITALS
OXYGEN SATURATION: 96 % | HEART RATE: 86 BPM | WEIGHT: 174.4 LBS | TEMPERATURE: 97.8 F | DIASTOLIC BLOOD PRESSURE: 89 MMHG | HEIGHT: 60 IN | SYSTOLIC BLOOD PRESSURE: 153 MMHG | BODY MASS INDEX: 34.24 KG/M2

## 2023-02-08 DIAGNOSIS — Z12.31 VISIT FOR SCREENING MAMMOGRAM: ICD-10-CM

## 2023-02-08 DIAGNOSIS — H57.813 BROW PTOSIS, BILATERAL: Primary | ICD-10-CM

## 2023-02-08 DIAGNOSIS — H02.31 BLEPHAROCHALASIS OF BOTH UPPER EYELIDS: ICD-10-CM

## 2023-02-08 DIAGNOSIS — H02.34 BLEPHAROCHALASIS OF BOTH UPPER EYELIDS: ICD-10-CM

## 2023-02-08 PROCEDURE — 99203 OFFICE O/P NEW LOW 30 MIN: CPT | Performed by: SURGERY

## 2023-02-09 PROBLEM — H02.31 BLEPHAROCHALASIS OF BOTH UPPER EYELIDS: Status: ACTIVE | Noted: 2023-02-09

## 2023-02-09 PROBLEM — H02.34 BLEPHAROCHALASIS OF BOTH UPPER EYELIDS: Status: ACTIVE | Noted: 2023-02-09

## 2023-02-09 PROBLEM — H57.813 BROW PTOSIS, BILATERAL: Status: ACTIVE | Noted: 2023-02-09

## 2023-02-17 ENCOUNTER — PATIENT ROUNDING (BHMG ONLY) (OUTPATIENT)
Dept: PLASTIC SURGERY | Facility: CLINIC | Age: 58
End: 2023-02-17
Payer: OTHER GOVERNMENT

## 2023-02-17 NOTE — PROGRESS NOTES
2/17/23    My name is Kaitlin Huber, AMINA with [facility].    I want to officially welcome you to our practice and ask about your recent visit.       If you could please tell me about your recent visit with us. What things went well?   All did, I think everything went well.    We're always looking for ways to make our patients' experiences even better. Do you have recommendations on ways we may improve?  No, everything was great.    Overall were you satisfied with your first visit to our practice?  Very much so.    I appreciate you taking the time to answer a few questions today.    Please note that you may also receive a survey related to your visit, should you receive that we ask that you please complete it and return it so that we can monitor how we are doing with overall patient care.     Thank you and have a great day.    Kaitlin Huber, PCT

## 2023-03-13 ENCOUNTER — TRANSCRIBE ORDERS (OUTPATIENT)
Dept: PHYSICAL THERAPY | Facility: CLINIC | Age: 58
End: 2023-03-13
Payer: OTHER GOVERNMENT

## 2023-03-13 DIAGNOSIS — S49.91XA INJURY OF RIGHT UPPER EXTREMITY, INITIAL ENCOUNTER: Primary | ICD-10-CM

## 2023-03-22 ENCOUNTER — TREATMENT (OUTPATIENT)
Dept: PHYSICAL THERAPY | Facility: CLINIC | Age: 58
End: 2023-03-22
Payer: OTHER MISCELLANEOUS

## 2023-03-22 DIAGNOSIS — M25.611 DECREASED ROM OF RIGHT SHOULDER: ICD-10-CM

## 2023-03-22 DIAGNOSIS — M79.601 RIGHT ARM PAIN: Primary | ICD-10-CM

## 2023-03-22 DIAGNOSIS — R20.0 NUMBNESS AND TINGLING: ICD-10-CM

## 2023-03-22 DIAGNOSIS — R29.898 RIGHT ARM WEAKNESS: ICD-10-CM

## 2023-03-22 DIAGNOSIS — R20.2 NUMBNESS AND TINGLING: ICD-10-CM

## 2023-03-22 PROCEDURE — 97166 OT EVAL MOD COMPLEX 45 MIN: CPT | Performed by: OCCUPATIONAL THERAPIST

## 2023-03-22 NOTE — PROGRESS NOTES
"Occupational Therapy Initial Evaluation and Plan of Care  [facility]  OT: [address] [place] [address]    Patient: Neda Bah   : 1965  Diagnosis/ICD-10 Code:  Right arm pain [M79.601]  Referring practitioner: David Newsome PA-C  Date of Initial Visit: 3/22/2023  Today's Date: 3/22/2023  Patient seen for 1 sessions           Subjective Questionnaire: QuickDASH: 40/55      Subjective Evaluation    History of Present Illness  Date of onset: 2023  Mechanism of injury: Pt is a RHD 58 y/o female who presents to therapy with c/o R UE pain from shoulder down to wrist. Pt reports increased tenderness. Pt also reports occasional numbness/tingling. On 23 pt was at work when she fell landing on R arm. Xrays were negative. Pt was provided oral steroids that did not help. Pt reports symptoms are getting worse. Pt is a  at a gym on [address]. Pt is currently on light duty.      Precautions and Work Restrictions: 5 lb lifting restrictionPain  Current pain ratin  At best pain ratin  At worst pain ratin  Location: R UE  Quality: sore, shooting.  Relieving factors: medications and heat  Aggravating factors: movement and lifting  Progression: worsening    Social Support  Lives with: spouse    Hand dominance: right    Diagnostic Tests  X-ray: normal    Treatments  Previous treatment: medication  Patient Goals  Patient goals for therapy: decreased pain, increased motion, increased strength, independence with ADLs/IADLs and return to sport/leisure activities  Patient goal: \"To get rid of the pain and get my arm back to how it should be. Restore my function\"           Objective          Tenderness     Right Shoulder  Tenderness in the biceps tendon (proximal). No tenderness in the AC joint, clavicle, infraspinatus tendon, lateral scapula, medial scapula, scapular spine and supraspinatus tendon.     Right Elbow   Tenderness in the distal biceps tendon. No tenderness in the lateral " epicondyle, medial epicondyle and olecranon process.     Right Wrist/Hand   Tenderness in the first dorsal compartment and distal biceps tendon. Tenderness in the lateral epicondyle, medial epicondyle and olecranon process.     Additional Tenderness Details  Posterior forearm  Medial anterior wrist. Snuff box.    Cervical/Thoracic Screen   Cervical range of motion within normal limits  Cervical range of motion within normal limits with the following exceptions: No pain/tightness noted with cervical ROM.    Neurological Testing     Additional Neurological Details  Pt scored WNL on monofilament testing. R UE is generally tender to touch.    Active Range of Motion   Left Shoulder   Abduction: 100 degrees     Right Shoulder   Flexion: WFL and with pain  Extension: WFL  Abduction: 95 degrees with pain  External rotation BTH: WFL and with pain  Internal rotation BTB: lumbar     Left Elbow   Forearm supination: 65 degrees   Forearm pronation: 65 degrees     Right Elbow   Flexion: WFL  Extension: WFL  Forearm supination: 60 degrees   Forearm pronation: 50 degrees with pain    Left Wrist   Wrist extension: 55 degrees   Ulnar deviation: 35 degrees     Right Wrist   Wrist flexion: WFL  Wrist extension: 40 degrees with pain  Radial deviation: WFL  Ulnar deviation: 30 degrees with pain    Joint Play     Right Elbow   Joints within functional limits are the humeroulnar joint, humeroradial joint and distal radioulnar joint.     Strength/Myotome Testing     Right Shoulder     Planes of Motion   Flexion: WFL   Extension: WFL   Abduction: WFL   Adduction: 4   External rotation at 0°: WFL   Internal rotation at 0°: 4     Isolated Muscles   Biceps: 4   Triceps: 5     Right Elbow   Flexion: WFL  Forearm supination: 3+  Forearm pronation: 3+    Left Wrist/Hand      (2nd hand position)     Trial 1: 33 lbs    Trial 2: 40 lbs    Trial 3: 35 lbs    Average: 36 lbs    Right Wrist/Hand      (2nd hand position)     Trial 1: 5 lbs     Trial 2: 3 lbs    Trial 3: 3 lbs    Average: 3.67 lbs    Tests     Right Shoulder   Positive Speed's.   Negative belly press and Hawkin's.     Right Elbow   Negative elbow flexion, Tinel's sign (cubital tunnel), valgus stress at 0 degrees and varus stress at 0 degrees.     Right Wrist/Hand   Positive Finkelstein's.   Negative resisted middle finger, Tinel's sign (medial nerve) and Tinel's sign (radial tunnel).           Assessment & Plan     Assessment  Impairments: abnormal or restricted ROM, activity intolerance, impaired physical strength, lacks appropriate home exercise program and pain with function  Functional Limitations: carrying objects, lifting, sleeping, pulling, pushing, uncomfortable because of pain, reaching behind back and reaching overhead  Assessment details: Pt will benefit from skilled OT secondary to decreased strength/ROM and increased pain that limits pt ability to complete ADLs.  Prognosis: good    Goals  Plan Goals: SHOULDER  PROBLEMS:     1. The patient has limited ROM of the R UE.    LTG 1: 12 weeks:  The patient will demonstrate ROM within 5 degrees of L UE (shoulder, elbow, wrist) to allow the patient to reach into upper kitchen cabinets and manipulate clothing behind the back with greater ease.    STATUS:  New   STG 1a: 8 weeks:  The patient will demonstrate ROM within 10 degrees of L UE (shoulder, elbow, wrist)     STATUS:  New   TREATMENT: Manual therapy, therapeutic exercise, home exercise instruction, and modalities as needed to include: electrical stimulation, ultrasound, moist heat, and ice.    2. The patient has limited strength of the R UE.   LTG 2: 12 weeks:  The patient will demonstrate 5 /5 strength for R UE in order to complete job tasks.    STATUS:  New   STG 2a: 8 weeks:  The patient will demonstrate 4+ /5 strength for R UE.    STATUS:  New   STG2b:  8 weeks:  The patient will be independent with home exercises.     STATUS:  New   TREATMENT: Manual therapy, therapeutic  exercise, home exercise instruction, and modalities as needed to include: electrical stimulation, ultrasound, moist heat, and ice.     3. The patient complains of pain to the R UE.   LTG 3: 12 weeks:  The patient will report a pain rating of 1 /10 or better in order to improve sleep quality and tolerance to performance of activities of daily living.    STATUS:  New   STG 3a: 8 weeks:  The patient will report a pain rating of 3 /10 or better.      STATUS:  New   TREATMENT: Manual therapy, therapeutic exercise, home exercise instruction, and modalities as needed to include: electrical stimulation, ultrasound, moist heat, and ice.    4. Carrying, Moving, and Handling Objects Functional Limitation     LTG 4: 12 weeks:  The patient will demonstrate 1-19 % limitation by achieving a score of 19/55 on the QuickDASH.    STATUS:  New   STG 4a: 8 weeks:  The patient will demonstrate 20-39 % limitation by achieving a score of 27/55 on the QuickDASH.      STATUS:  New   TREATMENT:  Manual therapy, therapeutic exercise, home exercise instruction, and modalities as needed to include: moist heat, electrical stimulation, and ultrasound.    Plan  Planned modality interventions: cryotherapy and thermotherapy (hydrocollator packs)  Planned therapy interventions: body mechanics training, fine motor coordination training, flexibility, functional ROM exercises, home exercise program, joint mobilization, manual therapy, neuromuscular re-education, postural training, soft tissue mobilization, strengthening, stretching and therapeutic activities  Frequency: 2x week  Duration in weeks: 12  Treatment plan discussed with: patient      Pt is indicated for skilled occupational therapy services.    Un-Timed:  Mod Eval     60     Mins  [id number]    Timed Treatment:   0   mins   Total Treatment:     60   mins    OT SIGNATURE: Vishal Jorgensen OT   DATE TREATMENT INITIATED: 3/22/2023  [place] License: [id number]    Initial Certification  Certification Period:  6/19/2023  I certify that the therapy services are furnished while this patient is under my care.  The services outlined above are required by this patient, and will be reviewed every 90 days.     PHYSICIAN: David Newsome PA-C      DATE:   MD NPI: [id number]  Please sign and return via fax to   [phone].. Thank you, [facility].

## 2023-03-29 ENCOUNTER — TREATMENT (OUTPATIENT)
Dept: PHYSICAL THERAPY | Facility: CLINIC | Age: 58
End: 2023-03-29
Payer: OTHER MISCELLANEOUS

## 2023-03-29 DIAGNOSIS — M25.611 DECREASED ROM OF RIGHT SHOULDER: ICD-10-CM

## 2023-03-29 DIAGNOSIS — R20.2 NUMBNESS AND TINGLING: ICD-10-CM

## 2023-03-29 DIAGNOSIS — R29.898 RIGHT ARM WEAKNESS: ICD-10-CM

## 2023-03-29 DIAGNOSIS — M79.601 RIGHT ARM PAIN: Primary | ICD-10-CM

## 2023-03-29 DIAGNOSIS — R20.0 NUMBNESS AND TINGLING: ICD-10-CM

## 2023-03-29 PROCEDURE — 97530 THERAPEUTIC ACTIVITIES: CPT | Performed by: OCCUPATIONAL THERAPIST

## 2023-03-29 PROCEDURE — 97110 THERAPEUTIC EXERCISES: CPT | Performed by: OCCUPATIONAL THERAPIST

## 2023-03-29 NOTE — PROGRESS NOTES
Occupational Therapy Daily Treatment Note  [facility] OT: [address] [place] [address]      Patient: Neda Bah   : 1965  Diagnosis/ICD-10 Code:  Right arm pain [M79.601]  Referring practitioner: David Newsome PA-C  Date of Initial Visit: Type: THERAPY  Noted: 3/22/2023  Today's Date: 3/29/2023  Patient seen for 2 sessions             Subjective   Neda Bah reports: 4-5/10 pain R forearm.    Objective     See Exercise, Manual, and Modality Logs for complete treatment.       Assessment/Plan  Pt reports pain along 1st dorsal compartment with prayer stretch. OT encouraged pt to complete in shorter range to improve comfort. Pt verbalizes understanding. OT provided wrist extensor/flexor stretch HEP. Teachback successful.  Progress per Plan of Care           Timed:  Therapeutic Exercise:    10     mins  98870;      Therapeutic Activity:     10     mins  [id number];       Timed Treatment:   20   mins   Total Treatment:     20   mins        Vishal Jorgensen OT  Occupational Therapist  [place] License: [id number]  NPI: [id number]

## 2023-04-03 ENCOUNTER — OFFICE VISIT (OUTPATIENT)
Dept: ORTHOPEDIC SURGERY | Facility: CLINIC | Age: 58
End: 2023-04-03
Payer: OTHER MISCELLANEOUS

## 2023-04-03 ENCOUNTER — TREATMENT (OUTPATIENT)
Dept: PHYSICAL THERAPY | Facility: CLINIC | Age: 58
End: 2023-04-03
Payer: OTHER MISCELLANEOUS

## 2023-04-03 VITALS — HEIGHT: 60 IN | OXYGEN SATURATION: 97 % | HEART RATE: 103 BPM | BODY MASS INDEX: 34.75 KG/M2 | WEIGHT: 177 LBS

## 2023-04-03 DIAGNOSIS — M79.601 RIGHT ARM PAIN: Primary | ICD-10-CM

## 2023-04-03 DIAGNOSIS — R29.898 RIGHT ARM WEAKNESS: ICD-10-CM

## 2023-04-03 DIAGNOSIS — R20.0 NUMBNESS AND TINGLING: ICD-10-CM

## 2023-04-03 DIAGNOSIS — S46.001A INJURY OF RIGHT ROTATOR CUFF, INITIAL ENCOUNTER: Primary | ICD-10-CM

## 2023-04-03 DIAGNOSIS — R20.2 NUMBNESS AND TINGLING: ICD-10-CM

## 2023-04-03 DIAGNOSIS — M25.611 DECREASED ROM OF RIGHT SHOULDER: ICD-10-CM

## 2023-04-03 PROCEDURE — 99203 OFFICE O/P NEW LOW 30 MIN: CPT | Performed by: STUDENT IN AN ORGANIZED HEALTH CARE EDUCATION/TRAINING PROGRAM

## 2023-04-03 PROCEDURE — 97530 THERAPEUTIC ACTIVITIES: CPT | Performed by: OCCUPATIONAL THERAPIST

## 2023-04-03 PROCEDURE — 97110 THERAPEUTIC EXERCISES: CPT | Performed by: OCCUPATIONAL THERAPIST

## 2023-04-03 PROCEDURE — 97140 MANUAL THERAPY 1/> REGIONS: CPT | Performed by: OCCUPATIONAL THERAPIST

## 2023-04-03 RX ORDER — DICLOFENAC SODIUM 75 MG/1
1 TABLET, DELAYED RELEASE ORAL EVERY 12 HOURS SCHEDULED
COMMUNITY
Start: 2023-03-24

## 2023-04-03 NOTE — PROGRESS NOTES
"Chief Complaint  Pain and Initial Evaluation of the Right Shoulder    Subjective          Neda Bah presents to [facility] for   History of Present Illness    The patient presents here today for evaluation of the right upper extremity. She reports on 23 she had a fall at work landing on that side. She has pain with elevation. She was seen and evaluated with x-rays. She has had 2 sessions of PT. She had x-rays that were negative. She has been taking diclofenac.     No Known Allergies     Social History     Socioeconomic History   • Marital status:    Tobacco Use   • Smoking status: Former     Packs/day: 1.00     Years: 20.00     Pack years: 20.00     Types: Cigarettes     Start date: 1981     Quit date: 2015     Years since quittin.0   • Smokeless tobacco: Never   Vaping Use   • Vaping Use: Never used   Substance and Sexual Activity   • Alcohol use: Yes     Alcohol/week: 2.0 standard drinks     Types: 2 Glasses of wine per week   • Drug use: Never   • Sexual activity: Not Currently     Partners: Male     Birth control/protection: Hysterectomy     Comment: N/A        I reviewed the patient's chief complaint, history of present illness, review of systems, past medical history, surgical history, family history, social history, medications, and allergy list.     REVIEW OF SYSTEMS    Constitutional: Denies fevers, chills, weight loss  Cardiovascular: Denies chest pain, shortness of breath  Skin: Denies rashes, acute skin changes  Neurologic: Denies headache, loss of consciousness  MSK: Right upper extremity pain      Objective   Vital Signs:   Pulse 103   Ht 152.4 cm (60\")   Wt 80.3 kg (177 lb)   SpO2 97%   BMI 34.57 kg/m²     Body mass index is 34.57 kg/m².    Physical Exam    General: Alert. No acute distress.   Right upper extremity- mild tenderness to acromioclavicular joint. Non-tender to the biceps. Forward elevation 180. External Rotation 45. Internal " rotation to the waistline. 4/5 supraspinatus strength with pain. 4/5 infraspinatus and 5/5 subscapularis. Pain with impingement. Negative speeds. Negative O'marilyn. Wrist extension 70, 80 flexion. Full elbow motion. No mechanical symptoms with wrist joint motion.     Procedures    Imaging Results (Most Recent)     None                   Assessment and Plan        No results found.     Diagnoses and all orders for this visit:    1. Injury of right rotator cuff, initial encounter (Primary)        Discussed the treatment plan with the patient.  I reviewed the previous x-rays. Plan to continue physical therapy at this time. Plan to continue taking diclofenac. Work note given today with restrictions.     Call or return if worsening symptoms.    Scribed for David Messina MD by Ashlee Reddy  04/03/2023   14:59 EDT         Follow Up       6 weeks    Patient was given instructions and counseling regarding her condition or for health maintenance advice. Please see specific information pulled into the AVS if appropriate.       I have personally performed the services described in this document as scribed by the above individual and it is both accurate and complete.     David Messina MD  04/03/23  15:05 EDT

## 2023-04-05 ENCOUNTER — TREATMENT (OUTPATIENT)
Dept: PHYSICAL THERAPY | Facility: CLINIC | Age: 58
End: 2023-04-05
Payer: OTHER MISCELLANEOUS

## 2023-04-05 DIAGNOSIS — R20.2 NUMBNESS AND TINGLING: ICD-10-CM

## 2023-04-05 DIAGNOSIS — R29.898 RIGHT ARM WEAKNESS: ICD-10-CM

## 2023-04-05 DIAGNOSIS — M25.611 DECREASED ROM OF RIGHT SHOULDER: ICD-10-CM

## 2023-04-05 DIAGNOSIS — M25.511 RIGHT SHOULDER PAIN, UNSPECIFIED CHRONICITY: ICD-10-CM

## 2023-04-05 DIAGNOSIS — R20.0 NUMBNESS AND TINGLING: ICD-10-CM

## 2023-04-05 DIAGNOSIS — S46.001A INJURY OF RIGHT ROTATOR CUFF, INITIAL ENCOUNTER: Primary | ICD-10-CM

## 2023-04-05 DIAGNOSIS — M79.601 RIGHT ARM PAIN: Primary | ICD-10-CM

## 2023-04-05 PROCEDURE — 97140 MANUAL THERAPY 1/> REGIONS: CPT | Performed by: OCCUPATIONAL THERAPIST

## 2023-04-05 PROCEDURE — 97110 THERAPEUTIC EXERCISES: CPT | Performed by: OCCUPATIONAL THERAPIST

## 2023-04-05 PROCEDURE — 97530 THERAPEUTIC ACTIVITIES: CPT | Performed by: OCCUPATIONAL THERAPIST

## 2023-04-05 NOTE — PROGRESS NOTES
Occupational Therapy Daily Treatment Note  El[facility] OT: [address] [place] [address]      Patient: Neda Bah   : 1965  Diagnosis/ICD-10 Code:  Right arm pain [M79.601]  Referring practitioner: David Newsome PA-C  Date of Initial Visit: Type: THERAPY  Noted: 3/22/2023  Today's Date: 2023  Patient seen for 4 sessions         QuickDASH: 34/55    Subjective   Neda Bah reports: 4/10 pain from R mid humeral area to wrist. Pt reports less pain with functional tasks at home.    Objective          Tenderness     Right Shoulder  Tenderness in the biceps tendon (proximal). No tenderness in the AC joint, clavicle, infraspinatus tendon, lateral scapula, medial scapula, scapular spine and supraspinatus tendon.     Right Elbow   Tenderness in the distal biceps tendon. No tenderness in the lateral epicondyle, medial epicondyle and olecranon process.     Right Wrist/Hand   Tenderness in the first dorsal compartment and distal biceps tendon. Tenderness in the lateral epicondyle, medial epicondyle and olecranon process.     Additional Tenderness Details  Posterior forearm  Medial anterior wrist. Snuff box.    Cervical/Thoracic Screen   Cervical range of motion within normal limits  Cervical range of motion within normal limits with the following exceptions: No pain/tightness noted with cervical ROM.    Neurological Testing     Additional Neurological Details  Pt scored WNL on monofilament testing. R UE is generally tender to touch.    Active Range of Motion   Left Shoulder   Abduction: 100 degrees     Right Shoulder   Flexion: WFL  Extension: WFL  Abduction: WFL  External rotation BTH: WFL  Internal rotation BTB: lumbar with pain    Left Elbow   Forearm supination: 65 degrees   Forearm pronation: 65 degrees     Right Elbow   Flexion: WFL  Extension: WFL  Forearm supination: 65 degrees   Forearm pronation: 55 degrees     Left Wrist   Wrist extension: 55 degrees   Ulnar deviation: 35 degrees     Right  Wrist   Wrist flexion: WFL  Wrist extension: 56 degrees   Radial deviation: WFL  Ulnar deviation: 32 degrees     Joint Play     Right Elbow   Joints within functional limits are the humeroulnar joint, humeroradial joint and distal radioulnar joint.     Strength/Myotome Testing     Right Shoulder   Normal muscle strength    Right Elbow   Flexion: WFL  Forearm supination: 3+  Forearm pronation: 3+    Left Wrist/Hand      (2nd hand position)     Trial 1: 33 lbs    Trial 2: 40 lbs    Trial 3: 35 lbs    Average: 36 lbs    Right Wrist/Hand      (2nd hand position)     Trial 1: 8 lbs    Trial 2: 11 lbs    Trial 3: 15 lbs    Average: 11.33 lbs    Tests     Right Shoulder   Positive Speed's.   Negative belly press and Hawkin's.     Right Elbow   Negative elbow flexion, Tinel's sign (cubital tunnel), valgus stress at 0 degrees and varus stress at 0 degrees.     Right Wrist/Hand   Positive Finkelstein's.   Negative resisted middle finger, Tinel's sign (medial nerve) and Tinel's sign (radial tunnel).         See Exercise, Manual, and Modality Logs for complete treatment.       Assessment & Plan     Assessment  Impairments: abnormal or restricted ROM, activity intolerance, impaired physical strength, lacks appropriate home exercise program and pain with function  Functional Limitations: carrying objects, lifting, sleeping, pulling, pushing, uncomfortable because of pain, reaching behind back and reaching overhead  Assessment details: Pt displays improved ROM/strength and reports decreased pain with functional tasks. Pt continues with decreased functional strength and increased pain that limits ability to complete ADLs/IADLs. Pt met 3/5 STGs.  Prognosis: good    Goals  Plan Goals: SHOULDER  PROBLEMS:     1. The patient has limited ROM of the R UE.    LTG 1: 12 weeks:  The patient will demonstrate ROM within 5 degrees of L UE (shoulder, elbow, wrist) to allow the patient to reach into upper kitchen cabinets and manipulate  clothing behind the back with greater ease.    STATUS:  New   STG 1a: 8 weeks:  The patient will demonstrate ROM within 10 degrees of L UE (shoulder, elbow, wrist)     STATUS:  Met   TREATMENT: Manual therapy, therapeutic exercise, home exercise instruction, and modalities as needed to include: electrical stimulation, ultrasound, moist heat, and ice.    2. The patient has limited strength of the R UE.   LTG 2: 12 weeks:  The patient will demonstrate 5 /5 strength for R UE in order to complete job tasks.    STATUS:  New   STG 2a: 8 weeks:  The patient will demonstrate 4+ /5 strength for R UE.    STATUS:  Met   STG2b:  8 weeks:  The patient will be independent with home exercises.     STATUS:  Met   TREATMENT: Manual therapy, therapeutic exercise, home exercise instruction, and modalities as needed to include: electrical stimulation, ultrasound, moist heat, and ice.     3. The patient complains of pain to the R UE.   LTG 3: 12 weeks:  The patient will report a pain rating of 1 /10 or better in order to improve sleep quality and tolerance to performance of activities of daily living.    STATUS:  New   STG 3a: 8 weeks:  The patient will report a pain rating of 3 /10 or better.      STATUS:  Not met   TREATMENT: Manual therapy, therapeutic exercise, home exercise instruction, and modalities as needed to include: electrical stimulation, ultrasound, moist heat, and ice.    4. Carrying, Moving, and Handling Objects Functional Limitation     LTG 4: 12 weeks:  The patient will demonstrate 1-19 % limitation by achieving a score of 19/55 on the QuickDASH.    STATUS:  New   STG 4a: 8 weeks:  The patient will demonstrate 20-39 % limitation by achieving a score of 27/55 on the QuickDASH.      STATUS:  Not met   TREATMENT:  Manual therapy, therapeutic exercise, home exercise instruction, and modalities as needed to include: moist heat, electrical stimulation, and ultrasound.    Plan  Planned modality interventions: cryotherapy and  thermotherapy (hydrocollator packs)  Planned therapy interventions: body mechanics training, fine motor coordination training, flexibility, functional ROM exercises, home exercise program, joint mobilization, manual therapy, neuromuscular re-education, postural training, soft tissue mobilization, strengthening, stretching and therapeutic activities  Frequency: 2x week  Duration in weeks: 12  Treatment plan discussed with: patient        Progress per Plan of Care           Timed:  Manual Therapy:    8     mins  74555;  Therapeutic Exercise:    14     mins  84244;     Therapeutic Activity:     8     mins  84663;       Timed Treatment:   30   mins   Total Treatment:     30   mins        Vishal Jorgensen OT  Occupational Therapist  [place] License: [id number]  NPI: [id number]

## 2023-04-19 ENCOUNTER — TREATMENT (OUTPATIENT)
Dept: PHYSICAL THERAPY | Facility: CLINIC | Age: 58
End: 2023-04-19
Payer: OTHER MISCELLANEOUS

## 2023-04-19 DIAGNOSIS — M79.601 RIGHT ARM PAIN: Primary | ICD-10-CM

## 2023-04-19 DIAGNOSIS — M25.611 DECREASED ROM OF RIGHT SHOULDER: ICD-10-CM

## 2023-04-19 DIAGNOSIS — R20.0 NUMBNESS AND TINGLING: ICD-10-CM

## 2023-04-19 DIAGNOSIS — R20.2 NUMBNESS AND TINGLING: ICD-10-CM

## 2023-04-19 DIAGNOSIS — R29.898 RIGHT ARM WEAKNESS: ICD-10-CM

## 2023-04-19 PROCEDURE — 97140 MANUAL THERAPY 1/> REGIONS: CPT | Performed by: OCCUPATIONAL THERAPIST

## 2023-04-19 PROCEDURE — 97530 THERAPEUTIC ACTIVITIES: CPT | Performed by: OCCUPATIONAL THERAPIST

## 2023-04-19 PROCEDURE — 97110 THERAPEUTIC EXERCISES: CPT | Performed by: OCCUPATIONAL THERAPIST

## 2023-04-19 NOTE — PROGRESS NOTES
"OT Re-evaluation/Progress Note  Elio  OT: [address]  [place] [address]    Patient: Neda Bah   : 1965  Diagnosis/ICD-10 Code:  Right arm pain [M79.601]  Referring practitioner: David Newsome PA-C  Date of Initial Visit: Type: THERAPY  Noted: 3/22/2023  Today's Date: 2023  Patient seen for 5 sessions      Subjective:   Subjective Questionnaire: QuickDASH:   Clinical Progress: improved  Home Program Compliance: \"Some\"  Treatment has included: therapeutic exercise, manual therapy, therapeutic activity and moist heat    Subjective   Objective          Tenderness     Right Shoulder  No tenderness in the AC joint, biceps tendon (proximal), clavicle, infraspinatus tendon, lateral scapula, medial scapula, scapular spine and supraspinatus tendon.     Right Elbow   No tenderness in the distal biceps tendon, lateral epicondyle, medial epicondyle and olecranon process.     Right Wrist/Hand   Tenderness in the first dorsal compartment, distal biceps tendon, lateral epicondyle, medial epicondyle and olecranon process.     Additional Tenderness Details  Posterior forearm  Medial anterior wrist.    Cervical/Thoracic Screen   Cervical range of motion within normal limits  Cervical range of motion within normal limits with the following exceptions: No pain/tightness noted with cervical ROM.    Neurological Testing     Additional Neurological Details  Pt scored WNL on monofilament testing. R UE is generally tender to touch.    Active Range of Motion   Left Shoulder   Abduction: 100 degrees     Right Shoulder   Flexion: WFL  Extension: WFL  Abduction: WFL  External rotation BTH: WFL  Internal rotation BTB: mid thoracic     Left Elbow   Forearm supination: 65 degrees   Forearm pronation: 65 degrees     Right Elbow   Flexion: WFL  Extension: WFL  Forearm supination: 69 degrees   Forearm pronation: 69 degrees     Left Wrist   Wrist extension: 55 degrees   Ulnar deviation: 35 degrees     Right Wrist " Last Office Visit   Visit date not found  Upcoming: Visit date not found    Last Refill - #20 on 11/28/21 with no refills    Please Advise     Wrist flexion: WFL  Wrist extension: 53 degrees   Radial deviation: WFL  Ulnar deviation: 38 degrees with pain    Additional Active Range of Motion Details  Uncomfortable with pronation.    Joint Play     Right Elbow   Joints within functional limits are the humeroulnar joint, humeroradial joint and distal radioulnar joint.     Strength/Myotome Testing     Right Shoulder     Planes of Motion   Flexion: WFL   Extension: WFL   Abduction: WFL   Adduction: WFL   External rotation at 0°: WFL   Internal rotation at 0°: 4+     Isolated Muscles   Biceps: 4+   Triceps: 5     Right Elbow   Normal strength    Left Wrist/Hand      (2nd hand position)     Trial 1: 33 lbs    Trial 2: 40 lbs    Trial 3: 35 lbs    Average: 36 lbs    Right Wrist/Hand      (2nd hand position)     Trial 1: 25 lbs    Trial 2: 30 lbs    Trial 3: 30 lbs    Average: 28.33 lbs    Tests     Right Shoulder   Positive Speed's.   Negative belly press and Hawkin's.     Right Elbow   Negative elbow flexion, Tinel's sign (cubital tunnel), valgus stress at 0 degrees and varus stress at 0 degrees.     Right Wrist/Hand   Positive Finkelstein's.   Negative resisted middle finger, Tinel's sign (medial nerve) and Tinel's sign (radial tunnel).       Assessment & Plan     Assessment  Impairments: abnormal or restricted ROM, activity intolerance, impaired physical strength, lacks appropriate home exercise program and pain with function  Functional Limitations: carrying objects, lifting, sleeping, pulling, pushing, uncomfortable because of pain, reaching behind back and reaching overhead  Assessment details: Pt displays improved strength and ROM and decreased pain/tenderness. Pt met 4/5 STGs. Pt continues with decreased strength and increased tenderness/pain with functional movements that limit ability to complete ADLs.  Prognosis: good    Goals  Plan Goals: SHOULDER  PROBLEMS:     1. The patient has limited ROM of the R UE.    LTG 1: 12 weeks:  The patient will  demonstrate ROM within 5 degrees of L UE (shoulder, elbow, wrist) to allow the patient to reach into upper kitchen cabinets and manipulate clothing behind the back with greater ease.    STATUS:  New   STG 1a: 8 weeks:  The patient will demonstrate ROM within 10 degrees of L UE (shoulder, elbow, wrist)     STATUS:  Met   TREATMENT: Manual therapy, therapeutic exercise, home exercise instruction, and modalities as needed to include: electrical stimulation, ultrasound, moist heat, and ice.    2. The patient has limited strength of the R UE.   LTG 2: 12 weeks:  The patient will demonstrate 5 /5 strength for R UE in order to complete job tasks.    STATUS:  New   STG 2a: 8 weeks:  The patient will demonstrate 4+ /5 strength for R UE.    STATUS:  Met   STG2b:  8 weeks:  The patient will be independent with home exercises.     STATUS:  Not met   TREATMENT: Manual therapy, therapeutic exercise, home exercise instruction, and modalities as needed to include: electrical stimulation, ultrasound, moist heat, and ice.     3. The patient complains of pain to the R UE.   LTG 3: 12 weeks:  The patient will report a pain rating of 1 /10 or better in order to improve sleep quality and tolerance to performance of activities of daily living.    STATUS:  New   STG 3a: 8 weeks:  The patient will report a pain rating of 3 /10 or better.      STATUS:  Met   TREATMENT: Manual therapy, therapeutic exercise, home exercise instruction, and modalities as needed to include: electrical stimulation, ultrasound, moist heat, and ice.    4. Carrying, Moving, and Handling Objects Functional Limitation     LTG 4: 12 weeks:  The patient will demonstrate 1-19 % limitation by achieving a score of 19/55 on the QuickDASH.    STATUS:  New   STG 4a: 8 weeks:  The patient will demonstrate 20-39 % limitation by achieving a score of 27/55 on the QuickDASH.      STATUS:  Met   TREATMENT:  Manual therapy, therapeutic exercise, home exercise instruction, and  modalities as needed to include: moist heat, electrical stimulation, and ultrasound.    Plan  Planned modality interventions: cryotherapy and thermotherapy (hydrocollator packs)  Planned therapy interventions: body mechanics training, fine motor coordination training, flexibility, functional ROM exercises, home exercise program, joint mobilization, manual therapy, neuromuscular re-education, postural training, soft tissue mobilization, strengthening, stretching and therapeutic activities  Frequency: 2x week  Duration in weeks: 12  Treatment plan discussed with: patient      Progress toward previous goals: Partially Met      Recommendations: Continue as planned  Timeframe: 1 month  Prognosis to achieve goals: good    OT SIGNATURE: Vishal Jorgensen OT   DATE TREATMENT INITIATED: Type: THERAPY  Noted: 3/22/2023  [place] License: [id number]    Based upon review of the patient's progress and continued therapy plan, it is my medical opinion that Neda Bah should continue physical therapy treatment at [facility]  [address] [place] [address] [place] [address] [place] [id number] [phone].    Signature: __________________________________  David Newsome PA-C MD NPI: [id number]  Timed:  Manual Therapy:    8     mins  98949;  Therapeutic Exercise:    14     mins  05922;      Therapeutic Activity:     8     mins  51554;       Timed Treatment:   30   mins   Total Treatment:     30   mins  Please sign and return via fax to [phone]  . Thank you, [facility].

## 2023-04-24 ENCOUNTER — TREATMENT (OUTPATIENT)
Dept: PHYSICAL THERAPY | Facility: CLINIC | Age: 58
End: 2023-04-24
Payer: OTHER MISCELLANEOUS

## 2023-04-24 DIAGNOSIS — R20.2 NUMBNESS AND TINGLING: ICD-10-CM

## 2023-04-24 DIAGNOSIS — M25.611 DECREASED ROM OF RIGHT SHOULDER: ICD-10-CM

## 2023-04-24 DIAGNOSIS — M79.601 RIGHT ARM PAIN: Primary | ICD-10-CM

## 2023-04-24 DIAGNOSIS — R29.898 RIGHT ARM WEAKNESS: ICD-10-CM

## 2023-04-24 DIAGNOSIS — R20.0 NUMBNESS AND TINGLING: ICD-10-CM

## 2023-04-24 PROCEDURE — 97110 THERAPEUTIC EXERCISES: CPT | Performed by: OCCUPATIONAL THERAPIST

## 2023-04-24 PROCEDURE — 97530 THERAPEUTIC ACTIVITIES: CPT | Performed by: OCCUPATIONAL THERAPIST

## 2023-04-24 PROCEDURE — 97140 MANUAL THERAPY 1/> REGIONS: CPT | Performed by: OCCUPATIONAL THERAPIST

## 2023-04-24 NOTE — PROGRESS NOTES
Occupational Therapy Daily Treatment Note  Elio OT: [address] [place]  [place] [address]      Patient: Neda Bah   : 1965  Diagnosis/ICD-10 Code:  Right arm pain [M79.601]  Referring practitioner: David Newsome PA-C  Date of Initial Visit: Type: THERAPY  Noted: 3/22/2023  Today's Date: 2023  Patient seen for 6 sessions             Subjective   Neda Bah reports: 2/10 pain R bicep. Pt reports arm is getting better.    Objective     See Exercise, Manual, and Modality Logs for complete treatment.       Assessment/Plan  Pt tolerated strengthening exercises well.  Progress per Plan of Care           Timed:  Manual Therapy:    8     mins  27423;  Therapeutic Exercise:    12     mins  32699;      Therapeutic Activity:     8     mins  54139;       Timed Treatment:   28   mins   Total Treatment:     28   mins        Vishal Jorgensen OT  Occupational Therapist  [place] License: [id number]  NPI: [id number]

## 2023-04-26 ENCOUNTER — TREATMENT (OUTPATIENT)
Dept: PHYSICAL THERAPY | Facility: CLINIC | Age: 58
End: 2023-04-26
Payer: OTHER MISCELLANEOUS

## 2023-04-26 DIAGNOSIS — R20.2 NUMBNESS AND TINGLING: ICD-10-CM

## 2023-04-26 DIAGNOSIS — M79.601 RIGHT ARM PAIN: Primary | ICD-10-CM

## 2023-04-26 DIAGNOSIS — M25.611 DECREASED ROM OF RIGHT SHOULDER: ICD-10-CM

## 2023-04-26 DIAGNOSIS — R29.898 RIGHT ARM WEAKNESS: ICD-10-CM

## 2023-04-26 DIAGNOSIS — R20.0 NUMBNESS AND TINGLING: ICD-10-CM

## 2023-04-26 PROCEDURE — 97530 THERAPEUTIC ACTIVITIES: CPT | Performed by: OCCUPATIONAL THERAPIST

## 2023-04-26 PROCEDURE — 97110 THERAPEUTIC EXERCISES: CPT | Performed by: OCCUPATIONAL THERAPIST

## 2023-04-26 PROCEDURE — 97140 MANUAL THERAPY 1/> REGIONS: CPT | Performed by: OCCUPATIONAL THERAPIST

## 2023-04-26 NOTE — PROGRESS NOTES
Occupational Therapy Daily Treatment Note  Elio OT: [address] [place] [address]      Patient: Neda Bah   : 1965  Diagnosis/ICD-10 Code:  Right arm pain [M79.601]  Referring practitioner: David Newsome PA-C  Date of Initial Visit: Type: THERAPY  Noted: 3/22/2023  Today's Date: 2023  Patient seen for 7 sessions             Subjective   Neda Bah reports: No current pain.    Objective     See Exercise, Manual, and Modality Logs for complete treatment.       Assessment/Plan  Pt tolerated well this date. Pt displays improved strength.  Progress per Plan of Care           Timed:  Manual Therapy:    8     mins  96412;  Therapeutic Exercise:    12     mins  05987;       Therapeutic Activity:     8     mins  93377;       Timed Treatment:   28   mins   Total Treatment:     28   mins        Vishal Jorgensen OT  Occupational Therapist  [place] License: [id number]  NPI: [id number]

## 2023-05-01 ENCOUNTER — TREATMENT (OUTPATIENT)
Dept: PHYSICAL THERAPY | Facility: CLINIC | Age: 58
End: 2023-05-01
Payer: OTHER MISCELLANEOUS

## 2023-05-01 ENCOUNTER — TELEPHONE (OUTPATIENT)
Dept: PLASTIC SURGERY | Facility: CLINIC | Age: 58
End: 2023-05-01

## 2023-05-01 DIAGNOSIS — R29.898 RIGHT ARM WEAKNESS: ICD-10-CM

## 2023-05-01 DIAGNOSIS — M25.611 DECREASED ROM OF RIGHT SHOULDER: ICD-10-CM

## 2023-05-01 DIAGNOSIS — M79.601 RIGHT ARM PAIN: Primary | ICD-10-CM

## 2023-05-01 DIAGNOSIS — R20.2 NUMBNESS AND TINGLING: ICD-10-CM

## 2023-05-01 DIAGNOSIS — R20.0 NUMBNESS AND TINGLING: ICD-10-CM

## 2023-05-01 PROCEDURE — 97530 THERAPEUTIC ACTIVITIES: CPT | Performed by: OCCUPATIONAL THERAPIST

## 2023-05-01 PROCEDURE — 97110 THERAPEUTIC EXERCISES: CPT | Performed by: OCCUPATIONAL THERAPIST

## 2023-05-01 NOTE — PROGRESS NOTES
OT Re-evaluation/Progress Note  Elio  OT: [address]  [place] [address]    Patient: Neda Bah   : 1965  Diagnosis/ICD-10 Code:  Right arm pain [M79.601]  Referring practitioner: David Newsome PA-C  Date of Initial Visit: Type: THERAPY  Noted: 3/22/2023  Today's Date: 2023  Patient seen for 8 sessions      Subjective:   Subjective Questionnaire: QuickDASH:   Clinical Progress: improved  Home Program Compliance: Yes  Treatment has included: therapeutic exercise, manual therapy, therapeutic activity and moist heat    Subjective Evaluation    Pain  Current pain ratin         Objective          Tenderness     Right Shoulder  No tenderness in the AC joint, biceps tendon (proximal), clavicle, infraspinatus tendon, lateral scapula, medial scapula, scapular spine and supraspinatus tendon.     Additional Tenderness Details  Posterior forearm    Cervical/Thoracic Screen   Cervical range of motion within normal limits  Cervical range of motion within normal limits with the following exceptions: No pain/tightness noted with cervical ROM.    Neurological Testing     Additional Neurological Details  Pt scored WNL on monofilament testing.    Active Range of Motion   Left Shoulder   Abduction: 100 degrees     Right Shoulder   Flexion: WFL  Extension: WFL  Abduction: WFL  External rotation BTH: WFL  Internal rotation BTB: mid thoracic     Left Elbow   Forearm supination: 65 degrees   Forearm pronation: 65 degrees     Right Elbow   Flexion: WFL  Extension: WFL  Forearm supination: 69 degrees   Forearm pronation: 69 degrees     Left Wrist   Wrist extension: 55 degrees   Ulnar deviation: 35 degrees     Right Wrist   Wrist flexion: WFL  Wrist extension: 53 degrees   Radial deviation: WFL  Ulnar deviation: 38 degrees with pain    Additional Active Range of Motion Details  Uncomfortable with pronation.    Joint Play     Right Elbow   Joints within functional limits are the humeroulnar joint,  humeroradial joint and distal radioulnar joint.     Strength/Myotome Testing     Right Shoulder     Planes of Motion   Flexion: WFL   Extension: WFL   Abduction: WFL   Adduction: WFL   External rotation at 0°: WFL   Internal rotation at 0°: WFL     Isolated Muscles   Biceps: WFL   Triceps: 5     Right Elbow   Normal strength    Left Wrist/Hand      (2nd hand position)     Trial 1: 33 lbs    Trial 2: 40 lbs    Trial 3: 35 lbs    Average: 36 lbs    Right Wrist/Hand      (2nd hand position)     Trial 1: 36 lbs    Trial 2: 35 lbs    Trial 3: 35 lbs    Average: 35.33 lbs    Tests     Right Shoulder   Positive Speed's.   Negative belly press and Hawkin's.     Right Elbow   Negative elbow flexion, Tinel's sign (cubital tunnel), valgus stress at 0 degrees and varus stress at 0 degrees.     Right Wrist/Hand   Negative Finkelstein's, resisted middle finger, Tinel's sign (medial nerve) and Tinel's sign (radial tunnel).       Assessment & Plan     Assessment  Impairments: abnormal or restricted ROM, activity intolerance, impaired physical strength, lacks appropriate home exercise program and pain with function  Functional Limitations: carrying objects, lifting, sleeping, pulling, pushing, uncomfortable because of pain, reaching behind back and reaching overhead  Assessment details: Pt displays improved strength and ROM and decreased pain/tenderness. Pt reports decreased disability as shown by improved QuickDASH scores. Pt met 5/5 STGs and 3/4 LTGs. Pt continues with decreased strength (with functional tasks per report) and increased pain with functional movements that limit ability to complete ADLs.  Prognosis: good    Goals  Plan Goals: SHOULDER  PROBLEMS:     1. The patient has limited ROM of the R UE.    LTG 1: 12 weeks:  The patient will demonstrate ROM within 5 degrees of L UE (shoulder, elbow, wrist) to allow the patient to reach into upper kitchen cabinets and manipulate clothing behind the back with greater  ease.    STATUS:  Not met   STG 1a: 8 weeks:  The patient will demonstrate ROM within 10 degrees of L UE (shoulder, elbow, wrist)     STATUS:  Met   TREATMENT: Manual therapy, therapeutic exercise, home exercise instruction, and modalities as needed to include: electrical stimulation, ultrasound, moist heat, and ice.    2. The patient has limited strength of the R UE.   LTG 2: 12 weeks:  The patient will demonstrate 5 /5 strength for R UE in order to complete job tasks.    STATUS:  Met   STG 2a: 8 weeks:  The patient will demonstrate 4+ /5 strength for R UE.    STATUS:  Met   STG2b:  8 weeks:  The patient will be independent with home exercises.     STATUS:  Met   TREATMENT: Manual therapy, therapeutic exercise, home exercise instruction, and modalities as needed to include: electrical stimulation, ultrasound, moist heat, and ice.     3. The patient complains of pain to the R UE.   LTG 3: 12 weeks:  The patient will report a pain rating of 1 /10 or better in order to improve sleep quality and tolerance to performance of activities of daily living.    STATUS:  Met   STG 3a: 8 weeks:  The patient will report a pain rating of 3 /10 or better.      STATUS:  Met   TREATMENT: Manual therapy, therapeutic exercise, home exercise instruction, and modalities as needed to include: electrical stimulation, ultrasound, moist heat, and ice.    4. Carrying, Moving, and Handling Objects Functional Limitation     LTG 4: 12 weeks:  The patient will demonstrate 1-19 % limitation by achieving a score of 19/55 on the QuickDASH.    STATUS:  Met   STG 4a: 8 weeks:  The patient will demonstrate 20-39 % limitation by achieving a score of 27/55 on the QuickDASH.      STATUS:  Met   TREATMENT:  Manual therapy, therapeutic exercise, home exercise instruction, and modalities as needed to include: moist heat, electrical stimulation, and ultrasound.    Plan  Planned modality interventions: cryotherapy and thermotherapy (hydrocollator  packs)  Planned therapy interventions: body mechanics training, fine motor coordination training, flexibility, functional ROM exercises, home exercise program, joint mobilization, manual therapy, neuromuscular re-education, postural training, soft tissue mobilization, strengthening, stretching and therapeutic activities  Frequency: 2x week  Duration in weeks: 12  Treatment plan discussed with: patient      Progress toward previous goals: Partially Met      Recommendations: Continue as planned  Timeframe: 2 weeks  Prognosis to achieve goals: good    OT SIGNATURE: Vishal Jorgensen OT   DATE TREATMENT INITIATED: Type: THERAPY  Noted: 3/22/2023  [place] License: [id number]    Based upon review of the patient's progress and continued therapy plan, it is my medical opinion that Neda Bah should continue physical therapy treatment at [facility]  [address] [place] [address] [place] [address] [place] [id number] [phone].    Signature: __________________________________  David Newsome PA-C MD NPI: [id number]  Timed:  Therapeutic Exercise:    23     mins  12932;     Therapeutic Activity:     8     mins  56857;       Timed Treatment:   31   mins   Total Treatment:     31   mins  Please sign and return via fax to [phone]  . Thank you, [facility].

## 2023-05-01 NOTE — TELEPHONE ENCOUNTER
Caller: Neda Bah    Relationship to patient: Self    Best call back number: [phone]    Type of visit: IN OFFICE PROCEDURE    Requested date: N/A     If rescheduling, when is the original appointment: 08/09/23     Additional notes:PT CALLED AND REQ TO CHANGE APPT. HUB UNABLE TO WARM TRANSFER

## 2023-05-03 ENCOUNTER — TREATMENT (OUTPATIENT)
Dept: PHYSICAL THERAPY | Facility: CLINIC | Age: 58
End: 2023-05-03
Payer: OTHER MISCELLANEOUS

## 2023-05-03 DIAGNOSIS — R20.2 NUMBNESS AND TINGLING: ICD-10-CM

## 2023-05-03 DIAGNOSIS — R20.0 NUMBNESS AND TINGLING: ICD-10-CM

## 2023-05-03 DIAGNOSIS — M25.611 DECREASED ROM OF RIGHT SHOULDER: ICD-10-CM

## 2023-05-03 DIAGNOSIS — M79.601 RIGHT ARM PAIN: Primary | ICD-10-CM

## 2023-05-03 DIAGNOSIS — R29.898 RIGHT ARM WEAKNESS: ICD-10-CM

## 2023-05-03 PROCEDURE — 97530 THERAPEUTIC ACTIVITIES: CPT | Performed by: OCCUPATIONAL THERAPIST

## 2023-05-03 PROCEDURE — 97110 THERAPEUTIC EXERCISES: CPT | Performed by: OCCUPATIONAL THERAPIST

## 2023-05-03 NOTE — PROGRESS NOTES
Occupational Therapy Daily Treatment Note  Elio OT: [address] [place]  [place] [address]      Patient: Neda Bah   : 1965  Diagnosis/ICD-10 Code:  Right arm pain [M79.601]  Referring practitioner: David Newsome PA-C  Date of Initial Visit: Type: THERAPY  Noted: 3/22/2023  Today's Date: 5/3/2023  Patient seen for 9 sessions             Subjective   Nead Bah reports: Right arm is doing well. No current pain.    Objective     See Exercise, Manual, and Modality Logs for complete treatment.       Assessment/Plan  Pt tolerated well with no c/o increased pain this date. Pt displays improved strength.  Progress per Plan of Care           Timed:  Therapeutic Exercise:    23     mins  13294;      Therapeutic Activity:     8     mins  27567;       Timed Treatment:   31   mins   Total Treatment:     31   mins        Vishal Jorgensen OT  Occupational Therapist  [place] License: [id number]  NPI: [id number]

## 2023-05-10 ENCOUNTER — TREATMENT (OUTPATIENT)
Dept: PHYSICAL THERAPY | Facility: CLINIC | Age: 58
End: 2023-05-10
Payer: OTHER MISCELLANEOUS

## 2023-05-10 DIAGNOSIS — R20.2 NUMBNESS AND TINGLING: ICD-10-CM

## 2023-05-10 DIAGNOSIS — R20.0 NUMBNESS AND TINGLING: ICD-10-CM

## 2023-05-10 DIAGNOSIS — R29.898 RIGHT ARM WEAKNESS: ICD-10-CM

## 2023-05-10 DIAGNOSIS — M79.601 RIGHT ARM PAIN: Primary | ICD-10-CM

## 2023-05-10 DIAGNOSIS — M25.611 DECREASED ROM OF RIGHT SHOULDER: ICD-10-CM

## 2023-05-10 PROCEDURE — 97530 THERAPEUTIC ACTIVITIES: CPT | Performed by: OCCUPATIONAL THERAPIST

## 2023-05-10 PROCEDURE — 97110 THERAPEUTIC EXERCISES: CPT | Performed by: OCCUPATIONAL THERAPIST

## 2023-05-10 NOTE — PROGRESS NOTES
Occupational Therapy Daily Treatment/Discharge Note  Elio OT: [address] [place] [address]      Patient: Neda Bah   : 1965  Diagnosis/ICD-10 Code:  Right arm pain [M79.601]  Referring practitioner: David Newsome PA-C  Date of Initial Visit: Type: THERAPY  Noted: 3/22/2023  Today's Date: 5/10/2023  Patient seen for 10 sessions         QuickDASH:     Subjective   Neda Bah reports: She is ready to progress to [facility] at this time. No current pain.    Objective          Tenderness     Right Shoulder  No tenderness in the AC joint, biceps tendon (proximal), clavicle, infraspinatus tendon, lateral scapula, medial scapula, scapular spine and supraspinatus tendon.     Cervical/Thoracic Screen   Cervical range of motion within normal limits  Cervical range of motion within normal limits with the following exceptions: No pain/tightness noted with cervical ROM.    Neurological Testing     Additional Neurological Details  Pt scored WNL on monofilament testing.    Active Range of Motion   Left Shoulder   Abduction: 100 degrees     Right Shoulder   Flexion: WFL  Extension: WFL  Abduction: WFL  External rotation BTH: WFL  Internal rotation BTB: mid thoracic     Left Elbow   Forearm supination: 65 degrees   Forearm pronation: 65 degrees     Right Elbow   Flexion: WFL  Extension: WFL  Forearm supination: 69 degrees   Forearm pronation: 69 degrees     Left Wrist   Wrist extension: 55 degrees   Ulnar deviation: 35 degrees     Right Wrist   Wrist flexion: WFL  Wrist extension: 53 degrees   Radial deviation: WFL  Ulnar deviation: 38 degrees with pain    Joint Play     Right Elbow   Joints within functional limits are the humeroulnar joint, humeroradial joint and distal radioulnar joint.     Strength/Myotome Testing     Right Shoulder     Planes of Motion   Flexion: WFL   Extension: WFL   Abduction: WFL   Adduction: WFL   External rotation at 0°: WFL   Internal rotation at 0°: WFL     Isolated Muscles    Biceps: WFL   Triceps: 5     Right Elbow   Normal strength    Left Wrist/Hand      (2nd hand position)     Trial 1: 33 lbs    Trial 2: 40 lbs    Trial 3: 35 lbs    Average: 36 lbs    Right Wrist/Hand      (2nd hand position)     Trial 1: 46 lbs    Trial 2: 43 lbs    Trial 3: 49 lbs    Average: 46 lbs    Tests     Right Shoulder   Negative belly press and Hawkin's.     Right Elbow   Negative elbow flexion, Tinel's sign (cubital tunnel), valgus stress at 0 degrees and varus stress at 0 degrees.     Right Wrist/Hand   Negative Finkelstein's, resisted middle finger, Tinel's sign (medial nerve) and Tinel's sign (radial tunnel).         See Exercise, Manual, and Modality Logs for complete treatment.       Assessment & Plan     Assessment  Impairments: abnormal or restricted ROM, activity intolerance, impaired physical strength, lacks appropriate home exercise program and pain with function  Functional Limitations: carrying objects, lifting, sleeping, pulling, pushing, uncomfortable because of pain, reaching behind back and reaching overhead  Assessment details: Pt displays improved strength and ROM and decreased pain/tenderness. Pt reports decreased disability as shown by improved QuickDASH scores. Pt met 5/5 STGs and 4/4 LTGs.   Prognosis: good    Goals  Plan Goals: SHOULDER  PROBLEMS:     1. The patient has limited ROM of the R UE.    LTG 1: 12 weeks:  The patient will demonstrate ROM within 5 degrees of L UE (shoulder, elbow, wrist) to allow the patient to reach into upper kitchen cabinets and manipulate clothing behind the back with greater ease.    STATUS:  Met   STG 1a: 8 weeks:  The patient will demonstrate ROM within 10 degrees of L UE (shoulder, elbow, wrist)     STATUS:  Met   TREATMENT: Manual therapy, therapeutic exercise, home exercise instruction, and modalities as needed to include: electrical stimulation, ultrasound, moist heat, and ice.    2. The patient has limited strength of the R UE.   LTG  2: 12 weeks:  The patient will demonstrate 5 /5 strength for R UE in order to complete job tasks.    STATUS:  Met   STG 2a: 8 weeks:  The patient will demonstrate 4+ /5 strength for R UE.    STATUS:  Met   STG2b:  8 weeks:  The patient will be independent with home exercises.     STATUS:  Met   TREATMENT: Manual therapy, therapeutic exercise, home exercise instruction, and modalities as needed to include: electrical stimulation, ultrasound, moist heat, and ice.     3. The patient complains of pain to the R UE.   LTG 3: 12 weeks:  The patient will report a pain rating of 1 /10 or better in order to improve sleep quality and tolerance to performance of activities of daily living.    STATUS:  Met   STG 3a: 8 weeks:  The patient will report a pain rating of 3 /10 or better.      STATUS:  Met   TREATMENT: Manual therapy, therapeutic exercise, home exercise instruction, and modalities as needed to include: electrical stimulation, ultrasound, moist heat, and ice.    4. Carrying, Moving, and Handling Objects Functional Limitation     LTG 4: 12 weeks:  The patient will demonstrate 1-19 % limitation by achieving a score of 19/55 on the QuickDASH.    STATUS:  Met   STG 4a: 8 weeks:  The patient will demonstrate 20-39 % limitation by achieving a score of 27/55 on the QuickDASH.      STATUS:  Met   TREATMENT:  Manual therapy, therapeutic exercise, home exercise instruction, and modalities as needed to include: moist heat, electrical stimulation, and ultrasound.    Plan  Planned modality interventions: cryotherapy and thermotherapy (hydrocollator packs)  Planned therapy interventions: body mechanics training, fine motor coordination training, flexibility, functional ROM exercises, home exercise program, joint mobilization, manual therapy, neuromuscular re-education, postural training, soft tissue mobilization, strengthening, stretching and therapeutic activities  Frequency: 2x week  Duration in weeks: 12  Treatment plan discussed  with: patient        Other Discharge to [facility] at this time.           Timed:  Therapeutic Exercise:    23     mins  [id number];     Therapeutic Activity:     8     mins  [id number];       Timed Treatment:   31   mins   Total Treatment:     31   mins        Vishal Jorgensen OT  Occupational Therapist  [place] License: [id number]  NPI: [id number]

## 2023-05-10 NOTE — LETTER
Elio OT: [address] [facility] [place] [address]      Patient: Neda Bah   : 1965  Diagnosis/ICD-10 Code:  Right arm pain [M79.601]  Referring practitioner: David Newsome PA-C  Date of Initial Visit: Type: THERAPY  Noted: 3/22/2023  Today's Date: 5/10/2023  Patient seen for 10 sessions    Pt is back to baseline. Pt reports no difficulties at work. OT recommends discharge to [facility] at this time.  QuickDASH:     Subjective   Neda Bah reports: She is ready to progress to [facility] at this time. No current pain.    Objective          Tenderness     Right Shoulder  No tenderness in the AC joint, biceps tendon (proximal), clavicle, infraspinatus tendon, lateral scapula, medial scapula, scapular spine and supraspinatus tendon.     Cervical/Thoracic Screen   Cervical range of motion within normal limits  Cervical range of motion within normal limits with the following exceptions: No pain/tightness noted with cervical ROM.    Neurological Testing     Additional Neurological Details  Pt scored WNL on monofilament testing.    Active Range of Motion   Left Shoulder   Abduction: 100 degrees     Right Shoulder   Flexion: WFL  Extension: WFL  Abduction: WFL  External rotation BTH: WFL  Internal rotation BTB: mid thoracic     Left Elbow   Forearm supination: 65 degrees   Forearm pronation: 65 degrees     Right Elbow   Flexion: WFL  Extension: WFL  Forearm supination: 69 degrees   Forearm pronation: 69 degrees     Left Wrist   Wrist extension: 55 degrees   Ulnar deviation: 35 degrees     Right Wrist   Wrist flexion: WFL  Wrist extension: 53 degrees   Radial deviation: WFL  Ulnar deviation: 38 degrees with pain    Joint Play     Right Elbow   Joints within functional limits are the humeroulnar joint, humeroradial joint and distal radioulnar joint.     Strength/Myotome Testing     Right Shoulder     Planes of Motion   Flexion: WFL   Extension: WFL   Abduction: WFL   Adduction: WFL   External rotation at 0°: WFL    Internal rotation at 0°: WFL     Isolated Muscles   Biceps: WFL   Triceps: 5     Right Elbow   Normal strength    Left Wrist/Hand      (2nd hand position)     Trial 1: 33 lbs    Trial 2: 40 lbs    Trial 3: 35 lbs    Average: 36 lbs    Right Wrist/Hand      (2nd hand position)     Trial 1: 46 lbs    Trial 2: 43 lbs    Trial 3: 49 lbs    Average: 46 lbs    Tests     Right Shoulder   Negative belly press and Hawkin's.     Right Elbow   Negative elbow flexion, Tinel's sign (cubital tunnel), valgus stress at 0 degrees and varus stress at 0 degrees.     Right Wrist/Hand   Negative Finkelstein's, resisted middle finger, Tinel's sign (medial nerve) and Tinel's sign (radial tunnel).

## 2023-05-15 ENCOUNTER — OFFICE VISIT (OUTPATIENT)
Dept: ORTHOPEDIC SURGERY | Facility: CLINIC | Age: 58
End: 2023-05-15
Payer: OTHER MISCELLANEOUS

## 2023-05-15 VITALS — HEIGHT: 60 IN | OXYGEN SATURATION: 97 % | BODY MASS INDEX: 34.75 KG/M2 | WEIGHT: 177 LBS

## 2023-05-15 DIAGNOSIS — S46.001D INJURY OF RIGHT ROTATOR CUFF, SUBSEQUENT ENCOUNTER: Primary | ICD-10-CM

## 2023-05-15 NOTE — PROGRESS NOTES
"Chief Complaint  Pain and Follow-up of the Right Shoulder    Subjective          Neda Bah presents to [facility] for   History of Present Illness    The patient presents here today for follow up evaluation of the right shoulder. The patient has calvin treating her right rotator cuff injury conservatively. She has been attending therapy and taking diclofenac. To review, on 23 she had a fall at work landing on that side. She has finished therapy. She denies any pain today.     No Known Allergies     Social History     Socioeconomic History   • Marital status:    Tobacco Use   • Smoking status: Former     Packs/day: 1.00     Years: 20.00     Pack years: 20.00     Types: Cigarettes     Start date: 1981     Quit date: 2015     Years since quittin.1   • Smokeless tobacco: Never   Vaping Use   • Vaping Use: Never used   Substance and Sexual Activity   • Alcohol use: Yes     Alcohol/week: 2.0 standard drinks     Types: 2 Glasses of wine per week   • Drug use: Never   • Sexual activity: Not Currently     Partners: Male     Birth control/protection: Hysterectomy     Comment: N/A        I reviewed the patient's chief complaint, history of present illness, review of systems, past medical history, surgical history, family history, social history, medications, and allergy list.     REVIEW OF SYSTEMS    Constitutional: Denies fevers, chills, weight loss  Cardiovascular: Denies chest pain, shortness of breath  Skin: Denies rashes, acute skin changes  Neurologic: Denies headache, loss of consciousness  MSK: Right shoulder pain      Objective   Vital Signs:   Ht 152.4 cm (60\")   Wt 80.3 kg (177 lb)   SpO2 97%   BMI 34.57 kg/m²     Body mass index is 34.57 kg/m².    Physical Exam    General: Alert. No acute distress.   Right upper extremity- Forward elevation 180. 5/5 rotator cuff testing. Negative impingement. Sensation to light touch median, radial, ulnar nerve. Positive " AIN, PIN, ulnar nerve motor function. Positive pulses.     Procedures    Imaging Results (Most Recent)     None                   Assessment and Plan        No results found.     Diagnoses and all orders for this visit:    1. Injury of right rotator cuff, subsequent encounter (Primary)        Discussed the treatment plan with the patient.  Plan to continue conservative treatment. She can resume activity as tolerated. Plan to continue home exercises and diclofenac.         Call or return if worsening symptoms.    Scribed for David Messina MD by Ashlee Reddy  05/15/2023   09:50 EDT         Follow Up       PRN    Patient was given instructions and counseling regarding her condition or for health maintenance advice. Please see specific information pulled into the AVS if appropriate.       I have personally performed the services described in this document as scribed by the above individual and it is both accurate and complete.     David Messina MD  05/15/23  10:00 EDT

## 2023-06-07 ENCOUNTER — OFFICE VISIT (OUTPATIENT)
Dept: FAMILY MEDICINE CLINIC | Facility: CLINIC | Age: 58
End: 2023-06-07
Payer: OTHER GOVERNMENT

## 2023-06-07 VITALS
SYSTOLIC BLOOD PRESSURE: 118 MMHG | BODY MASS INDEX: 34.5 KG/M2 | HEART RATE: 93 BPM | HEIGHT: 60 IN | TEMPERATURE: 97.9 F | DIASTOLIC BLOOD PRESSURE: 72 MMHG | OXYGEN SATURATION: 96 % | WEIGHT: 175.7 LBS

## 2023-06-07 DIAGNOSIS — J30.9 ALLERGIC RHINITIS, UNSPECIFIED SEASONALITY, UNSPECIFIED TRIGGER: Primary | ICD-10-CM

## 2023-06-07 DIAGNOSIS — H02.834 DERMATOCHALASIS OF BOTH UPPER EYELIDS: ICD-10-CM

## 2023-06-07 DIAGNOSIS — I10 PRIMARY HYPERTENSION: ICD-10-CM

## 2023-06-07 DIAGNOSIS — B00.1 RECURRENT COLD SORES: ICD-10-CM

## 2023-06-07 DIAGNOSIS — Z11.59 NEED FOR HEPATITIS C SCREENING TEST: ICD-10-CM

## 2023-06-07 DIAGNOSIS — E78.2 MIXED HYPERLIPIDEMIA: ICD-10-CM

## 2023-06-07 DIAGNOSIS — K21.9 GASTROESOPHAGEAL REFLUX DISEASE, UNSPECIFIED WHETHER ESOPHAGITIS PRESENT: ICD-10-CM

## 2023-06-07 DIAGNOSIS — E55.9 VITAMIN D DEFICIENCY: ICD-10-CM

## 2023-06-07 DIAGNOSIS — H02.831 DERMATOCHALASIS OF BOTH UPPER EYELIDS: ICD-10-CM

## 2023-06-07 PROBLEM — Z12.11 SCREENING FOR COLON CANCER: Status: ACTIVE | Noted: 2023-06-07

## 2023-06-07 LAB
25(OH)D3 SERPL-MCNC: 21.4 NG/ML (ref 30–100)
ALBUMIN SERPL-MCNC: 4.9 G/DL (ref 3.5–5.2)
ALBUMIN/GLOB SERPL: 2 G/DL
ALP SERPL-CCNC: 86 U/L (ref 39–117)
ALT SERPL W P-5'-P-CCNC: 42 U/L (ref 1–33)
ANION GAP SERPL CALCULATED.3IONS-SCNC: 15.9 MMOL/L (ref 5–15)
AST SERPL-CCNC: 41 U/L (ref 1–32)
BASOPHILS # BLD AUTO: 0.03 10*3/MM3 (ref 0–0.2)
BASOPHILS NFR BLD AUTO: 0.5 % (ref 0–1.5)
BILIRUB SERPL-MCNC: 0.8 MG/DL (ref 0–1.2)
BUN SERPL-MCNC: 10 MG/DL (ref 6–20)
BUN/CREAT SERPL: 14.1 (ref 7–25)
CALCIUM SPEC-SCNC: 10.4 MG/DL (ref 8.6–10.5)
CHLORIDE SERPL-SCNC: 99 MMOL/L (ref 98–107)
CHOLEST SERPL-MCNC: 163 MG/DL (ref 0–200)
CO2 SERPL-SCNC: 25.1 MMOL/L (ref 22–29)
CREAT SERPL-MCNC: 0.71 MG/DL (ref 0.57–1)
DEPRECATED RDW RBC AUTO: 41 FL (ref 37–54)
EGFRCR SERPLBLD CKD-EPI 2021: 99.9 ML/MIN/1.73
EOSINOPHIL # BLD AUTO: 0.07 10*3/MM3 (ref 0–0.4)
EOSINOPHIL NFR BLD AUTO: 1.1 % (ref 0.3–6.2)
ERYTHROCYTE [DISTWIDTH] IN BLOOD BY AUTOMATED COUNT: 12.6 % (ref 12.3–15.4)
GLOBULIN UR ELPH-MCNC: 2.4 GM/DL
GLUCOSE SERPL-MCNC: 102 MG/DL (ref 65–99)
HCT VFR BLD AUTO: 40.4 % (ref 34–46.6)
HCV AB SER DONR QL: NORMAL
HDLC SERPL-MCNC: 45 MG/DL (ref 40–60)
HGB BLD-MCNC: 13.7 G/DL (ref 12–15.9)
IMM GRANULOCYTES # BLD AUTO: 0.03 10*3/MM3 (ref 0–0.05)
IMM GRANULOCYTES NFR BLD AUTO: 0.5 % (ref 0–0.5)
LDLC SERPL CALC-MCNC: 86 MG/DL (ref 0–100)
LDLC/HDLC SERPL: 1.78 {RATIO}
LYMPHOCYTES # BLD AUTO: 1.81 10*3/MM3 (ref 0.7–3.1)
LYMPHOCYTES NFR BLD AUTO: 28.9 % (ref 19.6–45.3)
MCH RBC QN AUTO: 30.8 PG (ref 26.6–33)
MCHC RBC AUTO-ENTMCNC: 33.9 G/DL (ref 31.5–35.7)
MCV RBC AUTO: 90.8 FL (ref 79–97)
MONOCYTES # BLD AUTO: 0.56 10*3/MM3 (ref 0.1–0.9)
MONOCYTES NFR BLD AUTO: 8.9 % (ref 5–12)
NEUTROPHILS NFR BLD AUTO: 3.76 10*3/MM3 (ref 1.7–7)
NEUTROPHILS NFR BLD AUTO: 60.1 % (ref 42.7–76)
NRBC BLD AUTO-RTO: 0 /100 WBC (ref 0–0.2)
PLATELET # BLD AUTO: 237 10*3/MM3 (ref 140–450)
PMV BLD AUTO: 10.8 FL (ref 6–12)
POTASSIUM SERPL-SCNC: 4.3 MMOL/L (ref 3.5–5.2)
PROT SERPL-MCNC: 7.3 G/DL (ref 6–8.5)
RBC # BLD AUTO: 4.45 10*6/MM3 (ref 3.77–5.28)
SODIUM SERPL-SCNC: 140 MMOL/L (ref 136–145)
TRIGL SERPL-MCNC: 189 MG/DL (ref 0–150)
VLDLC SERPL-MCNC: 32 MG/DL (ref 5–40)
WBC NRBC COR # BLD: 6.26 10*3/MM3 (ref 3.4–10.8)

## 2023-06-07 PROCEDURE — 86803 HEPATITIS C AB TEST: CPT | Performed by: FAMILY MEDICINE

## 2023-06-07 PROCEDURE — 80061 LIPID PANEL: CPT | Performed by: FAMILY MEDICINE

## 2023-06-07 PROCEDURE — 82306 VITAMIN D 25 HYDROXY: CPT | Performed by: FAMILY MEDICINE

## 2023-06-07 PROCEDURE — 99214 OFFICE O/P EST MOD 30 MIN: CPT | Performed by: FAMILY MEDICINE

## 2023-06-07 PROCEDURE — 85025 COMPLETE CBC W/AUTO DIFF WBC: CPT | Performed by: FAMILY MEDICINE

## 2023-06-07 PROCEDURE — 80053 COMPREHEN METABOLIC PANEL: CPT | Performed by: FAMILY MEDICINE

## 2023-06-07 RX ORDER — CETIRIZINE HYDROCHLORIDE 10 MG/1
10 TABLET ORAL DAILY
Qty: 90 TABLET | Refills: 3 | Status: SHIPPED | OUTPATIENT
Start: 2023-06-07

## 2023-06-07 NOTE — PROGRESS NOTES
"Chief Complaint  Hypertension  HLD      Subjective        Neda Bah presents to [facility]  History of Present Illness  Patient presents today to follow-up for hypertension hyperlipidemia.  She is due for labs today and will get these done.  She is also due for screening for hepatitis C.  Her last lipid panel showed her LDL at 93 with triglycerides 242.  Her last vitamin D level was 32.3.  Plan to have her vitamin D level repeated today as well.  Gastroesophageal reflux symptoms have been under good control taking Tagamet.  She is also taking Zyrtec and Flonase for allergies which has been beneficial.  Blood pressure has been adequately controlled taking 10 mg of lisinopril daily.  She will be due for colonoscopy in 2026 as she reports her last one was normal in 2016.  She is up-to-date on her mammogram.  She is planning on having surgery for dermatochalasis of both upper eyelids with plastic surgery.  Objective   Vital Signs:  /72 (BP Location: Left arm, Patient Position: Sitting)   Pulse 93   Temp 97.9 °F (36.6 °C) (Oral)   Ht 152.4 cm (60\")   Wt 79.7 kg (175 lb 11.2 oz)   SpO2 96%   BMI 34.31 kg/m²   Estimated body mass index is 34.31 kg/m² as calculated from the following:    Height as of this encounter: 152.4 cm (60\").    Weight as of this encounter: 79.7 kg (175 lb 11.2 oz).       [unfilled]    Physical Exam   General: AAO ×3, no acute distress, pleasant  HEENT: Normocephalic, atraumatic  Cardiovascular: Regular rate and rhythm without appreciable murmur  Respiratory: Clear to auscultation bilaterally no RRW  Gastrointestinal: Soft nontender nondistended with bowel sounds present  extremities: No edema  Neurologic: CN II through XII grossly intact   Psychiatric: Normal mood and affect  Result Review :                   Assessment and Plan   Diagnoses and all orders for this visit:    1. Allergic rhinitis, unspecified seasonality, unspecified trigger " (Primary)    2. Dermatochalasis of both upper eyelids    3. Recurrent cold sores    4. Primary hypertension  -     CBC & Differential  -     Comprehensive Metabolic Panel    5. Gastroesophageal reflux disease, unspecified whether esophagitis present    6. Mixed hyperlipidemia  -     Lipid Panel    7. Vitamin D deficiency  -     Vitamin D,25-Hydroxy    8. Need for hepatitis C screening test  -     Hepatitis C Antibody    Other orders  -     cetirizine (ZyrTEC Allergy) 10 MG tablet; Take 1 tablet by mouth Daily.  Dispense: 90 tablet; Refill: 3    Plan as documented above.  I discussed with patient continuing current management of her chronic conditions.  We did discuss follow-up in 6 months.  Patient instructed to call with any questions or concerns.         Follow Up   Return in about 6 months (around 12/7/2023) for hypertension.  Patient was given instructions and counseling regarding her condition or for health maintenance advice. Please see specific information pulled into the AVS if appropriate.

## 2023-07-12 NOTE — PROGRESS NOTES
"Procedure (In office bleph and brow lift 7/20/23)          History of Present Illness  Neda Bah is a 57 y.o. female who presents to [facility] for in office blepharoplasty and brow lift 7/20/23.        Subjective       Patient has no known allergies.  Allergies Reconciled.    Review of Systems  All system were reviewed and were negative, except the ones noted above.     Objective     /79 (BP Location: Left arm, Patient Position: Sitting, Cuff Size: Adult)   Pulse 91   Temp 97.8 °F (36.6 °C) (Temporal)   Ht 152.4 cm (60\")   Wt 80.6 kg (177 lb 12.8 oz)   SpO2 95%   BMI 34.72 kg/m²     Body mass index is 34.72 kg/m².    Physical Exam   Cardiovascular: Normal rate.     Pulmonary/Chest  Effort normal.     Face:   At rest: Forehead: skin with mild elastosis and sun pigmentation, no signs of open lesions or moles with malignant features. Presence of static transverse wrinkles over the whole forehead.  No  glabellar rhytids. Both eyebrows are at the level of the orbital rim.   Upper Lids/ orbit: bilateral eyes reveal normal position without infection. The red sclera is normal.   There is mild bilateral eyelid ptosis   and the skin touches the upper eyelashes with bilateral elevated supratarsal fold. palpebral fissure is normal, the pretarsal skin is not visible in any of her eyes and there is bilateral field that obstructs her vision. The lateral canthus is at the medial canthus bilaterally. Marginal reflex distance is (MDR) 3 mm   Lower lids: there is good  skin  with no  significant relaxation, lid pulls away from the conjunctiva. Punctum is normal. Tug laxity is normal.  Snapback on bilateral eyes is normal     Medial face: moderate loss of soft tissue     Dynamic examination            Result Review :       Procedures        Plastic Surgery Op Note- BILATERAL LATERAL  BROW LIFT, BILATERAL BLEPHAROPLASTY    Neda Bah      Complications: " none        Date: 7/26/2023  Time: 07:30 EDT    After risks and benefits were discussed with patient and inform consent was signed, patient was taken to the procedure room and positioned supine.  Patient was marked in the upright position in preopterative holding room.  She was then infiltrated with local anesthetic agent, total of 25 cc of marcaine 0.25% with epinephrine 1:100 000 and 10cc lidocaine 0.25% 1:100 000, including supraorbital nerve block and forehead and incisional infiltration. She was placed in supine position appropriately padded and protected. She was prepped with tincture of Betadine and draped in the usual sterile fashion. A time out was performed confirming patient's name, procedure and location. The surgical team was re introduced by name.   Lateral  hairline incisions were carried out per markings and preservation of subcutaneous fat was carried out for approximately 1 cm anterior to the hairline incision, then entering the galeal avascular space. Dissection was carried down with blunt and sharp as well as electrocautery dissection to the supraorbital rims. Meticulous hemostasis was obtained with use of electrocautery and essentially no bleeding occurred. The flap was advanced and a total of 10 mm maximum width was removed at the lateral brow level, and to the hairline. The lateral wedge was taken of the thinning temporal hair pattern. Hemostasis was obtained after the excision of the excess flap tissue and irrigation of the wound was carried out The wound was closed with multi layer closure with 3-0 Vicryl deep and 4-0 Chromic on the lateral scalp a A good aesthetically pleasing symmetrical brow elevation  was obtained.    Then, the supra orbicular block was reinforced, and both the upper eyelids were marked with the inferior incision at 8 mm from the upper eyelid border. The excess of skin was pinched and marked superiorly. Care was taken to leave at least 1 cm from the upper incision to the  brow.   The area was then  anesthetized with lidocaine 1% with epinephrine 1: 200 000. An incision is then made along the markings with a 15 blade. The excess skin was then removed with the underlying orbicularis using a monopolar cautery   Hemostasis was attained with monopolar cautery with minimal cautery at the skin edges.  Attention was then directed medially where the orbital septum was opened and the pre septal fat pad was exposed. The fat was carefully teased forward to prevent deep hemorrhage and before the resection, additional local anesthesia was given. The remainder of the incision was closed with a running 4-0 nylon suture   The same procedure was performed on the other side.    Mary Haskins MD  07/26/23  07:30 EDT             Assessment and Plan      Diagnoses and all orders for this visit:    1. Blepharochalasis of both upper eyelids (Primary)    2. Brow ptosis, bilateral          Plan: Patient tolerated well without any complications, keep incisions clean and dry, keep steri strips in place and follow up in 1wk for suture removal       Scribed by Gabi Gardner, acting as a scribe for Mary Haskins MD, 07/20/23 12:11 EDT.  Mary Haskins MD's signature on the note affirms that the note adequately documents the care provided.         Follow Up     No follow-ups on file.        Mary Haskins MD  07/20/2023

## 2023-07-20 ENCOUNTER — PROCEDURE VISIT (OUTPATIENT)
Dept: PLASTIC SURGERY | Facility: CLINIC | Age: 58
End: 2023-07-20
Payer: OTHER GOVERNMENT

## 2023-07-20 VITALS
BODY MASS INDEX: 34.91 KG/M2 | HEART RATE: 91 BPM | DIASTOLIC BLOOD PRESSURE: 79 MMHG | SYSTOLIC BLOOD PRESSURE: 158 MMHG | WEIGHT: 177.8 LBS | HEIGHT: 60 IN | OXYGEN SATURATION: 95 % | TEMPERATURE: 97.8 F

## 2023-07-20 DIAGNOSIS — H02.34 BLEPHAROCHALASIS OF BOTH UPPER EYELIDS: Primary | ICD-10-CM

## 2023-07-20 DIAGNOSIS — H57.813 BROW PTOSIS, BILATERAL: ICD-10-CM

## 2023-07-20 DIAGNOSIS — H02.31 BLEPHAROCHALASIS OF BOTH UPPER EYELIDS: Primary | ICD-10-CM

## 2023-07-21 NOTE — PROGRESS NOTES
"Chief Complaint  Post-op Follow-up (Suture removal)    Subjective          History of Present Illness  Neda Bah is a 57 y.o. female who presents to [facility] for Postoperative Follow-Up of Bilateral Blepharoplasty and Brow Lift 07/20/2023.    She is 1wk post op and here for suture removal. Has been icing her eyes. Does have moderate dark bruising. Denies pain. Sutures intact. Patient states day after surgery her right eye had some blood drainage and now resolved.      Allergies: Patient has no known allergies.  Allergies Reconciled.    Review of Systems   All review of system has been reviewed and it  is negative except the ones note above.     Objective     /87 (BP Location: Left arm, Patient Position: Sitting, Cuff Size: Large Adult)   Pulse 88   Temp 98 °F (36.7 °C) (Temporal)   Ht 152.4 cm (60\")   Wt 79.7 kg (175 lb 9.6 oz)   SpO2 96%   BMI 34.29 kg/m²     Body mass index is 34.29 kg/m².    Physical Exam   Cardiovascular: Normal rate.     Pulmonary/Chest  Effort normal.     HEENT  Patient able to open and close eyes.  No lagophthalmos.  No ectropion.  The patient still has bilateral periorbital ecchymoses, right worse than left, especially on the lateral aspect of the tear trough.  She has bruising down the cheeks and onto the upper neck.  She has well-healed incisions in the temporal hairline.  Upper eyelid incisions are closed and clean.  No dehiscence.  Nontender.  Nylon sutures removed in their entirety      Result Review :                Assessment and Plan      Diagnoses and all orders for this visit:    1. Postoperative follow-up (Primary)        Plan:  Removed bilateral upper eyelid sutures  No worrisome changes  Some bruising and swelling, discussed will decrease over time  Patient reassured that the bruising will resolve over the next week or 2.  The possibility of having hemosiderin pigment deposition onto the underside of the " dermis was discussed.  There will be some mild discoloration for a couple of months as a consequence of this bruising that should resolve.  There is a small possibility the hemosiderin deposition on the underside of the dermis could produce result similar to tattooing and the pigmentation might be permanent.        Follow Up     Return RTC 2 weeks.    Patient was given instructions and counseling regarding her condition. Please see specific information pulled into the AVS if appropriate.     Heron Aguero MD  07/27/2023 Answers submitted by the patient for this visit:  Other (Submitted on 7/25/2023)  Please describe your symptoms.: Follow up  Have you had these symptoms before?: No  How long have you been having these symptoms?: 5-7 days  Primary Reason for Visit (Submitted on 7/25/2023)  What is the primary reason for your visit?: Other

## 2023-07-27 ENCOUNTER — OFFICE VISIT (OUTPATIENT)
Dept: PLASTIC SURGERY | Facility: CLINIC | Age: 58
End: 2023-07-27
Payer: OTHER GOVERNMENT

## 2023-07-27 VITALS
WEIGHT: 175.6 LBS | HEIGHT: 60 IN | BODY MASS INDEX: 34.47 KG/M2 | TEMPERATURE: 98 F | SYSTOLIC BLOOD PRESSURE: 143 MMHG | DIASTOLIC BLOOD PRESSURE: 87 MMHG | HEART RATE: 88 BPM | OXYGEN SATURATION: 96 %

## 2023-07-27 DIAGNOSIS — Z09 POSTOPERATIVE FOLLOW-UP: Primary | ICD-10-CM

## 2023-07-27 PROCEDURE — 99024 POSTOP FOLLOW-UP VISIT: CPT | Performed by: SURGERY

## 2023-07-31 NOTE — PROGRESS NOTES
"Chief Complaint  Post-op Follow-up (3 week post op)    Subjective          History of Present Illness  Neda Bah is a 57 y.o. female who presents to [facility] for Postoperative Follow-Up of Bilateral Blepharoplasty and Brow Lift 07/20/2023.    She is here today for 3wk post op. Still has concerns with small open area that keeps happening on right eye. Has some minimal blood drainage. Left eye is doing good. Denies pain. Has some scattered scabs in scab with some redness. Still has some moderate bruising underneath right eye.       Allergies: Patient has no known allergies.  Allergies Reconciled.    Review of Systems   All review of system has been reviewed and it  is negative except the ones note above.     Objective     /93 (BP Location: Left arm, Patient Position: Sitting, Cuff Size: Adult)   Pulse 105   Ht 152.4 cm (60\")   Wt 80.8 kg (178 lb 3.2 oz)   SpO2 97%   BMI 34.80 kg/mý     Body mass index is 34.8 kg/mý.    Physical Exam   Cardiovascular: Normal rate.     Pulmonary/Chest  Effort normal.     Eyes: Right eyelid incision with 3 small openings and small clot, no signs of infection       Result Review :                Assessment and Plan      Diagnoses and all orders for this visit:    1. Blepharochalasis of both upper eyelids (Primary)          Plan: Small clot removed and 3 areas closed with 5.0 Chromic Gut. RTO in 3wks for post op follow up     Scribed by Gabi Gardner, acting as a scribe for Mary Haskins MD, 08/10/23 14:12 EDT.  Mary Haskins MD's signature on the note affirms that the note adequately documents the care provided.          Follow Up     Return for 3wks for 6wk follow up .    Patient was given instructions and counseling regarding her condition. Please see specific information pulled into the AVS if appropriate.     Mary Haskins MD  Answers submitted by the patient for this visit:  Other (Submitted on " 8/3/2023)  Please describe your symptoms.: Follow up  Have you had these symptoms before?: No  How long have you been having these symptoms?: Greater than 2 weeks  Primary Reason for Visit (Submitted on 8/3/2023)  What is the primary reason for your visit?: Other

## 2023-08-10 ENCOUNTER — OFFICE VISIT (OUTPATIENT)
Dept: PLASTIC SURGERY | Facility: CLINIC | Age: 58
End: 2023-08-10
Payer: OTHER GOVERNMENT

## 2023-08-10 VITALS
BODY MASS INDEX: 34.99 KG/M2 | HEIGHT: 60 IN | HEART RATE: 105 BPM | OXYGEN SATURATION: 97 % | WEIGHT: 178.2 LBS | SYSTOLIC BLOOD PRESSURE: 140 MMHG | DIASTOLIC BLOOD PRESSURE: 93 MMHG

## 2023-08-10 DIAGNOSIS — H02.34 BLEPHAROCHALASIS OF BOTH UPPER EYELIDS: Primary | ICD-10-CM

## 2023-08-10 DIAGNOSIS — H02.31 BLEPHAROCHALASIS OF BOTH UPPER EYELIDS: Primary | ICD-10-CM

## 2023-08-28 NOTE — PROGRESS NOTES
"Chief Complaint  Post-op Follow-up (2 month post op )    Subjective          History of Present Illness  Neda Bah is a 57 y.o. female who presents to [facility] for Postoperative Follow-Up of Bilateral Blepharoplasty and Brow Lift 07/20/2023.    She is 2m post op and here for follow up     Concerned about bruising; no other issues our concerns.  Pt is happy with early results    Allergies: Patient has no known allergies.  Allergies Reconciled.    Review of Systems   All review of system has been reviewed and it  is negative except the ones note above.     Objective     /88 (BP Location: Right arm, Patient Position: Sitting, Cuff Size: Adult)   Pulse 100   Temp 98.4 °F (36.9 °C) (Temporal)   Ht 152.4 cm (60\")   Wt (!) 177 kg (390 lb 10.5 oz)   SpO2 90%   BMI 76.29 kg/m²     Body mass index is 76.29 kg/m².    Physical Exam   Cardiovascular: Normal rate.     Pulmonary/Chest  Effort normal.     Eyes:   Bruising resolving, incisions clean and intact    Result Review :                Assessment and Plan      Diagnoses and all orders for this visit:    1. Brow ptosis, bilateral (Primary)    2. Blepharochalasis of both upper eyelids            Plan:   Discussed bruising will go away  Pt will rtn PRN  Scribed by Martha Samuels, acting as a scribe for Mary Haskins MD, 09/07/23 09:12 EDT.  Mary Haskins MD's signature on the note affirms that the note adequately documents the care provided.     Follow Up     Return if symptoms worsen or fail to improve.    Patient was given instructions and counseling regarding her condition. Please see specific information pulled into the AVS if appropriate.     Mary Haskins MD    "

## 2023-09-07 ENCOUNTER — OFFICE VISIT (OUTPATIENT)
Dept: PLASTIC SURGERY | Facility: CLINIC | Age: 58
End: 2023-09-07
Payer: OTHER GOVERNMENT

## 2023-09-07 VITALS
SYSTOLIC BLOOD PRESSURE: 144 MMHG | DIASTOLIC BLOOD PRESSURE: 88 MMHG | BODY MASS INDEX: 57.52 KG/M2 | WEIGHT: 293 LBS | HEIGHT: 60 IN | TEMPERATURE: 98.4 F | HEART RATE: 100 BPM | OXYGEN SATURATION: 90 %

## 2023-09-07 DIAGNOSIS — H02.31 BLEPHAROCHALASIS OF BOTH UPPER EYELIDS: ICD-10-CM

## 2023-09-07 DIAGNOSIS — H02.34 BLEPHAROCHALASIS OF BOTH UPPER EYELIDS: ICD-10-CM

## 2023-09-07 DIAGNOSIS — H57.813 BROW PTOSIS, BILATERAL: Primary | ICD-10-CM

## 2023-09-07 PROCEDURE — 99024 POSTOP FOLLOW-UP VISIT: CPT | Performed by: SURGERY

## 2023-12-07 ENCOUNTER — OFFICE VISIT (OUTPATIENT)
Dept: FAMILY MEDICINE CLINIC | Facility: CLINIC | Age: 58
End: 2023-12-07
Payer: OTHER GOVERNMENT

## 2023-12-07 VITALS
DIASTOLIC BLOOD PRESSURE: 82 MMHG | HEART RATE: 93 BPM | TEMPERATURE: 98.4 F | OXYGEN SATURATION: 99 % | SYSTOLIC BLOOD PRESSURE: 124 MMHG | HEIGHT: 60 IN | BODY MASS INDEX: 34.83 KG/M2 | WEIGHT: 177.4 LBS

## 2023-12-07 DIAGNOSIS — S61.211S LACERATION OF LEFT INDEX FINGER WITHOUT FOREIGN BODY WITHOUT DAMAGE TO NAIL, SEQUELA: ICD-10-CM

## 2023-12-07 DIAGNOSIS — H02.831 DERMATOCHALASIS OF BOTH UPPER EYELIDS: ICD-10-CM

## 2023-12-07 DIAGNOSIS — E78.2 MIXED HYPERLIPIDEMIA: ICD-10-CM

## 2023-12-07 DIAGNOSIS — G89.29 CHRONIC BILATERAL THORACIC BACK PAIN: ICD-10-CM

## 2023-12-07 DIAGNOSIS — E55.9 VITAMIN D DEFICIENCY: ICD-10-CM

## 2023-12-07 DIAGNOSIS — H02.834 DERMATOCHALASIS OF BOTH UPPER EYELIDS: ICD-10-CM

## 2023-12-07 DIAGNOSIS — I10 PRIMARY HYPERTENSION: Primary | ICD-10-CM

## 2023-12-07 DIAGNOSIS — Z12.31 VISIT FOR SCREENING MAMMOGRAM: ICD-10-CM

## 2023-12-07 DIAGNOSIS — M54.6 CHRONIC BILATERAL THORACIC BACK PAIN: ICD-10-CM

## 2023-12-07 DIAGNOSIS — N62 MACROMASTIA: ICD-10-CM

## 2023-12-07 DIAGNOSIS — G89.29 CHRONIC NECK PAIN: ICD-10-CM

## 2023-12-07 DIAGNOSIS — M54.2 CHRONIC NECK PAIN: ICD-10-CM

## 2023-12-07 RX ORDER — NYSTATIN 100000 U/G
1 CREAM TOPICAL 2 TIMES DAILY
Qty: 30 G | Refills: 3 | Status: SHIPPED | OUTPATIENT
Start: 2023-12-07

## 2023-12-07 NOTE — PROGRESS NOTES
Chief Complaint  Laceration left index finger  Requesting breast reduction    Subjective        Neda Bah presents to [facility]  History of Present Illness  Patient presents today to discuss a laceration of the index finger of her left hand.  She went to urgent care on 11/20/2023.  Her  was helping to cut her hair and accidentally cut her finger.  She did receive a tetanus vaccination.  No sutures were placed.  She was given some Dermabond to help stop the bleeding.  She overall is doing well.  She denies any numbing or tingling feeling.  She has good range of motion of her left index finger.  I last saw her for an appointment on 6/7/2023.  She followed up for hypertension and hyperlipidemia.  Her blood pressure has been adequately controlled taking lisinopril 10 mg daily.  She continues to take atorvastatin 40 mg daily.  She will be due for labs again when she returns for follow-up.  Her lipid panel showed her LDL at 86.  CMP did show slightly elevated ALT and AST.  I plan to monitor at this time with routine labs.  CBC showed no anemia with normal white blood cell count and platelets.  She tested negative for hepatitis C.  She has dermatochalasis of both upper eyelids.  She has been treated by plastic surgery.    She would like to discuss issues with neck and upper back pain in regards to her breast size.  She states that she has a 38 double D bra cup size.  She reports having had issues even as an adolescent because of the breast size.  The straps are digging into her shoulders which is also been an issue.  She is interested in having breast reduction surgery done.  She does describe getting yeast infections often but she does use a topical antifungal which does help out.  I will send in nystatin today and we did discuss referral to plastic surgery for further evaluation.  I have also discussed getting x-rays done of the cervical and thoracic spine.  Objective  "  Vital Signs:  /82 (BP Location: Left arm, Patient Position: Sitting)   Pulse 93   Temp 98.4 °F (36.9 °C) (Oral)   Ht 152.4 cm (60\")   Wt 80.5 kg (177 lb 6.4 oz)   SpO2 99%   BMI 34.65 kg/m²   Estimated body mass index is 34.65 kg/m² as calculated from the following:    Height as of this encounter: 152.4 cm (60\").    Weight as of this encounter: 80.5 kg (177 lb 6.4 oz).               Physical Exam  Vitals reviewed.   Constitutional:       Appearance: Normal appearance.   HENT:      Head: Normocephalic and atraumatic.      Mouth/Throat:      Pharynx: No oropharyngeal exudate.   Eyes:      Conjunctiva/sclera: Conjunctivae normal.   Cardiovascular:      Rate and Rhythm: Normal rate and regular rhythm.      Heart sounds: No murmur heard.     No friction rub. No gallop.   Pulmonary:      Effort: Pulmonary effort is normal.      Breath sounds: Normal breath sounds. No wheezing or rhonchi.   Abdominal:      General: Bowel sounds are normal. There is no distension.      Palpations: Abdomen is soft.      Tenderness: There is no abdominal tenderness.   Musculoskeletal:      Comments: Paraspinal hypertonicity noted of the cervical and thoracic spine.  Nontender to palpation.   Skin:     Comments: Healing laceration noted on the index finger of the left hand distally.   Neurological:      Mental Status: She is alert and oriented to person, place, and time.      Cranial Nerves: No cranial nerve deficit.   Psychiatric:         Mood and Affect: Mood and affect normal.         Behavior: Behavior normal.         Thought Content: Thought content normal.         Judgment: Judgment normal.        Result Review :                   Assessment and Plan   Diagnoses and all orders for this visit:    1. Primary hypertension (Primary)  -     Comprehensive Metabolic Panel; Future  -     CBC & Differential; Future    2. Laceration of left index finger without foreign body without damage to nail, sequela    3. Dermatochalasis of " both upper eyelids    4. Chronic neck pain  -     XR Spine Cervical Complete 4 or 5 View; Future    5. Chronic bilateral thoracic back pain  -     XR Spine Thoracic 3 View; Future    6. Macromastia  -     Ambulatory Referral to Plastic Surgery    7. Visit for screening mammogram  -     Mammo Screening Digital Tomosynthesis Bilateral With CAD; Future    8. Vitamin D deficiency  -     Vitamin D,25-Hydroxy; Future    9. Mixed hyperlipidemia  -     Lipid Panel; Future    Other orders  -     nystatin (MYCOSTATIN) 029925 UNIT/GM cream; Apply 1 application  topically to the appropriate area as directed 2 (Two) Times a Day.  Dispense: 30 g; Refill: 3      I discussed with patient plan as documented above.  Referral to be made to plastic surgery.  Patient encouraged to apply nystatin regularly.  We did discuss getting x-rays completed as well.  She will be due for mammogram as well as her last one was done on 2/1/2023.  This was BI-RADS 2-benign.  She denies any breast complaints otherwise.  She is not interested in any medication to treat her neck or thoracic back pain at this time.  We did discuss continuing current treatment for hypertension as well as hyperlipidemia.       Follow Up   Return in about 6 months (around 6/7/2024) for hypertension.  Patient was given instructions and counseling regarding her condition or for health maintenance advice. Please see specific information pulled into the AVS if appropriate.

## 2024-01-05 ENCOUNTER — HOSPITAL ENCOUNTER (OUTPATIENT)
Dept: GENERAL RADIOLOGY | Facility: HOSPITAL | Age: 59
Discharge: HOME | End: 2024-01-05
Payer: OTHER GOVERNMENT

## 2024-01-05 DIAGNOSIS — G89.29 CHRONIC BILATERAL THORACIC BACK PAIN: ICD-10-CM

## 2024-01-05 DIAGNOSIS — G89.29 CHRONIC NECK PAIN: ICD-10-CM

## 2024-01-05 DIAGNOSIS — M54.2 CHRONIC NECK PAIN: ICD-10-CM

## 2024-01-05 DIAGNOSIS — M54.6 CHRONIC BILATERAL THORACIC BACK PAIN: ICD-10-CM

## 2024-01-05 PROCEDURE — 72072 X-RAY EXAM THORAC SPINE 3VWS: CPT

## 2024-01-05 PROCEDURE — 72050 X-RAY EXAM NECK SPINE 4/5VWS: CPT

## 2024-06-10 ENCOUNTER — OFFICE VISIT (OUTPATIENT)
Dept: FAMILY MEDICINE CLINIC | Facility: CLINIC | Age: 59
End: 2024-06-10
Payer: OTHER GOVERNMENT

## 2024-06-10 VITALS
DIASTOLIC BLOOD PRESSURE: 82 MMHG | WEIGHT: 174.4 LBS | SYSTOLIC BLOOD PRESSURE: 142 MMHG | HEART RATE: 84 BPM | HEIGHT: 60 IN | TEMPERATURE: 97.5 F | OXYGEN SATURATION: 97 % | BODY MASS INDEX: 34.24 KG/M2

## 2024-06-10 DIAGNOSIS — E78.2 MIXED HYPERLIPIDEMIA: ICD-10-CM

## 2024-06-10 DIAGNOSIS — E55.9 VITAMIN D DEFICIENCY: ICD-10-CM

## 2024-06-10 DIAGNOSIS — I10 PRIMARY HYPERTENSION: Primary | ICD-10-CM

## 2024-06-10 DIAGNOSIS — Z12.31 VISIT FOR SCREENING MAMMOGRAM: ICD-10-CM

## 2024-06-10 DIAGNOSIS — J30.9 ALLERGIC RHINITIS, UNSPECIFIED SEASONALITY, UNSPECIFIED TRIGGER: ICD-10-CM

## 2024-06-10 DIAGNOSIS — N62 MACROMASTIA: ICD-10-CM

## 2024-06-10 DIAGNOSIS — E83.42 HYPOMAGNESEMIA: ICD-10-CM

## 2024-06-10 DIAGNOSIS — B00.9 HERPES: ICD-10-CM

## 2024-06-10 LAB
25(OH)D3 SERPL-MCNC: 52.8 NG/ML (ref 30–100)
ALBUMIN SERPL-MCNC: 4.7 G/DL (ref 3.5–5.2)
ALBUMIN/GLOB SERPL: 2 G/DL
ALP SERPL-CCNC: 108 U/L (ref 39–117)
ALT SERPL W P-5'-P-CCNC: 51 U/L (ref 1–33)
ANION GAP SERPL CALCULATED.3IONS-SCNC: 12.9 MMOL/L (ref 5–15)
AST SERPL-CCNC: 58 U/L (ref 1–32)
BASOPHILS # BLD AUTO: 0.04 10*3/MM3 (ref 0–0.2)
BASOPHILS NFR BLD AUTO: 0.8 % (ref 0–1.5)
BILIRUB SERPL-MCNC: 0.9 MG/DL (ref 0–1.2)
BUN SERPL-MCNC: 9 MG/DL (ref 6–20)
BUN/CREAT SERPL: 12.9 (ref 7–25)
CALCIUM SPEC-SCNC: 9.8 MG/DL (ref 8.6–10.5)
CHLORIDE SERPL-SCNC: 99 MMOL/L (ref 98–107)
CHOLEST SERPL-MCNC: 149 MG/DL (ref 0–200)
CO2 SERPL-SCNC: 26.1 MMOL/L (ref 22–29)
CREAT SERPL-MCNC: 0.7 MG/DL (ref 0.57–1)
DEPRECATED RDW RBC AUTO: 39 FL (ref 37–54)
EGFRCR SERPLBLD CKD-EPI 2021: 101 ML/MIN/1.73
EOSINOPHIL # BLD AUTO: 0.09 10*3/MM3 (ref 0–0.4)
EOSINOPHIL NFR BLD AUTO: 1.8 % (ref 0.3–6.2)
ERYTHROCYTE [DISTWIDTH] IN BLOOD BY AUTOMATED COUNT: 12 % (ref 12.3–15.4)
GLOBULIN UR ELPH-MCNC: 2.4 GM/DL
GLUCOSE SERPL-MCNC: 140 MG/DL (ref 65–99)
HCT VFR BLD AUTO: 41.7 % (ref 34–46.6)
HDLC SERPL-MCNC: 39 MG/DL (ref 40–60)
HGB BLD-MCNC: 14 G/DL (ref 12–15.9)
IMM GRANULOCYTES # BLD AUTO: 0.01 10*3/MM3 (ref 0–0.05)
IMM GRANULOCYTES NFR BLD AUTO: 0.2 % (ref 0–0.5)
LDLC SERPL CALC-MCNC: 80 MG/DL (ref 0–100)
LDLC/HDLC SERPL: 1.93 {RATIO}
LYMPHOCYTES # BLD AUTO: 1.57 10*3/MM3 (ref 0.7–3.1)
LYMPHOCYTES NFR BLD AUTO: 30.9 % (ref 19.6–45.3)
MAGNESIUM SERPL-MCNC: 1.9 MG/DL (ref 1.6–2.6)
MCH RBC QN AUTO: 30.4 PG (ref 26.6–33)
MCHC RBC AUTO-ENTMCNC: 33.6 G/DL (ref 31.5–35.7)
MCV RBC AUTO: 90.7 FL (ref 79–97)
MONOCYTES # BLD AUTO: 0.53 10*3/MM3 (ref 0.1–0.9)
MONOCYTES NFR BLD AUTO: 10.4 % (ref 5–12)
NEUTROPHILS NFR BLD AUTO: 2.84 10*3/MM3 (ref 1.7–7)
NEUTROPHILS NFR BLD AUTO: 55.9 % (ref 42.7–76)
NRBC BLD AUTO-RTO: 0 /100 WBC (ref 0–0.2)
PLATELET # BLD AUTO: 185 10*3/MM3 (ref 140–450)
PMV BLD AUTO: 12 FL (ref 6–12)
POTASSIUM SERPL-SCNC: 4.4 MMOL/L (ref 3.5–5.2)
PROT SERPL-MCNC: 7.1 G/DL (ref 6–8.5)
RBC # BLD AUTO: 4.6 10*6/MM3 (ref 3.77–5.28)
SODIUM SERPL-SCNC: 138 MMOL/L (ref 136–145)
TRIGL SERPL-MCNC: 173 MG/DL (ref 0–150)
VLDLC SERPL-MCNC: 30 MG/DL (ref 5–40)
WBC NRBC COR # BLD AUTO: 5.08 10*3/MM3 (ref 3.4–10.8)

## 2024-06-10 PROCEDURE — 85025 COMPLETE CBC W/AUTO DIFF WBC: CPT | Performed by: FAMILY MEDICINE

## 2024-06-10 PROCEDURE — 80061 LIPID PANEL: CPT | Performed by: FAMILY MEDICINE

## 2024-06-10 PROCEDURE — 82306 VITAMIN D 25 HYDROXY: CPT | Performed by: FAMILY MEDICINE

## 2024-06-10 PROCEDURE — 99214 OFFICE O/P EST MOD 30 MIN: CPT | Performed by: FAMILY MEDICINE

## 2024-06-10 PROCEDURE — 83735 ASSAY OF MAGNESIUM: CPT | Performed by: FAMILY MEDICINE

## 2024-06-10 PROCEDURE — 80053 COMPREHEN METABOLIC PANEL: CPT | Performed by: FAMILY MEDICINE

## 2024-06-10 RX ORDER — VALACYCLOVIR HYDROCHLORIDE 500 MG/1
500 TABLET, FILM COATED ORAL DAILY
Qty: 90 TABLET | Refills: 3 | Status: SHIPPED | OUTPATIENT
Start: 2024-06-10

## 2024-06-10 RX ORDER — FLUTICASONE PROPIONATE 50 MCG
2 SPRAY, SUSPENSION (ML) NASAL DAILY
Qty: 48 G | Refills: 3 | Status: SHIPPED | OUTPATIENT
Start: 2024-06-10

## 2024-06-10 RX ORDER — ROSUVASTATIN CALCIUM 5 MG/1
5 TABLET, COATED ORAL DAILY
Qty: 90 TABLET | Refills: 1 | Status: SHIPPED | OUTPATIENT
Start: 2024-06-10

## 2024-06-10 RX ORDER — ATORVASTATIN CALCIUM 40 MG/1
40 TABLET, FILM COATED ORAL DAILY
Qty: 90 TABLET | Refills: 3 | Status: CANCELLED | OUTPATIENT
Start: 2024-06-10

## 2024-06-10 RX ORDER — CETIRIZINE HYDROCHLORIDE 10 MG/1
10 TABLET ORAL DAILY
Qty: 90 TABLET | Refills: 3 | Status: SHIPPED | OUTPATIENT
Start: 2024-06-10

## 2024-06-10 RX ORDER — LISINOPRIL 10 MG/1
10 TABLET ORAL DAILY
Qty: 90 TABLET | Refills: 3 | Status: SHIPPED | OUTPATIENT
Start: 2024-06-10

## 2024-06-10 RX ORDER — MONTELUKAST SODIUM 10 MG/1
10 TABLET ORAL NIGHTLY
Qty: 90 TABLET | Refills: 3 | Status: SHIPPED | OUTPATIENT
Start: 2024-06-10

## 2024-06-10 NOTE — PROGRESS NOTES
Chief Complaint  Hypertension      Subjective          Neda Bah presents to [facility]  History of Present Illness  Patient presents today to follow-up for hypertension.  She does not check her blood pressure regularly at home.  It is slightly elevated today.  She does take lisinopril 10 mg daily.  She is encouraged to check her blood pressure at home regularly.  She is getting muscle spasms/cramps at nighttime and is concerned about atorvastatin contributing.  She is interested in alternative options.  I did discuss with her a trial of Crestor.  Plan to check labs today including a lipid panel.  She continues to take Valtrex as she does have recurrent issues with herpes around her right eye.  She was previously seen by dermatology and had treatment for dermatochalasis of both eyes.  Her last mammogram was normal back in February, BI-RADS 2-benign.  She does have macromastia.  I did refer her to plastic surgery but she was told she needed to lose weight which she is working on.  She is not quite ready to commit to having breast reduction surgery either.  She does take vitamin D over-the-counter for deficiency.  Plan check labs today.    Current Outpatient Medications   Medication Instructions    cetirizine (ZYRTEC ALLERGY) 10 mg, Oral, Daily    cimetidine (TAGAMET) 400 MG tablet Take 1 PO daily    fluticasone (Flonase) 50 MCG/ACT nasal spray 2 sprays, Nasal, Daily    lisinopril (PRINIVIL,ZESTRIL) 10 mg, Oral, Daily    montelukast (SINGULAIR) 10 mg, Oral, Nightly    nystatin (MYCOSTATIN) 197934 UNIT/GM cream 1 application , Topical, 2 Times Daily    rosuvastatin (CRESTOR) 5 mg, Oral, Daily    valACYclovir (VALTREX) 500 mg, Oral, Daily       The following portions of the patient's history were reviewed and updated as appropriate: allergies, current medications, past family history, past medical history, past social history, past surgical history, and problem list.    Objective  "  Vital Signs:   /82 (BP Location: Right arm, Patient Position: Sitting)   Pulse 84   Temp 97.5 °F (36.4 °C)   Ht 152.4 cm (60\")   Wt 79.1 kg (174 lb 6.4 oz)   SpO2 97% Comment: room air  BMI 34.06 kg/m²     BP Readings from Last 3 Encounters:   06/10/24 142/82   12/07/23 124/82   11/24/23 139/76     Wt Readings from Last 3 Encounters:   06/10/24 79.1 kg (174 lb 6.4 oz)   12/07/23 80.5 kg (177 lb 6.4 oz)   11/24/23 103 kg (228 lb)           Physical Exam  Vitals reviewed.   Constitutional:       Appearance: Normal appearance.   HENT:      Head: Normocephalic and atraumatic.      Right Ear: External ear normal.      Left Ear: External ear normal.      Nose: Nose normal.   Eyes:      Conjunctiva/sclera: Conjunctivae normal.   Cardiovascular:      Rate and Rhythm: Normal rate and regular rhythm.      Heart sounds: No murmur heard.     No friction rub. No gallop.   Pulmonary:      Effort: Pulmonary effort is normal.      Breath sounds: Normal breath sounds. No wheezing or rhonchi.   Abdominal:      General: Bowel sounds are normal. There is no distension.      Palpations: Abdomen is soft.      Tenderness: There is no abdominal tenderness.   Skin:     General: Skin is warm and dry.   Neurological:      Mental Status: She is alert and oriented to person, place, and time.      Cranial Nerves: No cranial nerve deficit.   Psychiatric:         Mood and Affect: Mood and affect normal.         Behavior: Behavior normal.         Thought Content: Thought content normal.         Judgment: Judgment normal.            Result Review :   The following data was reviewed by: Osmani Mays DO on 06/10/2024:           Lab Results   Component Value Date    SARSANTIGEN Not Detected 05/17/2022    RAPFLUA Negative 05/17/2022    RAPFLUB Negative 05/17/2022    RAPSCRN Negative 05/17/2022       Procedures        Assessment and Plan    Diagnoses and all orders for this visit:    1. Primary hypertension (Primary)  -     " Comprehensive Metabolic Panel  -     CBC & Differential    2. Mixed hyperlipidemia  -     Lipid Panel    3. Herpes    4. Allergic rhinitis, unspecified seasonality, unspecified trigger    5. Hypomagnesemia  -     Magnesium    6. Vitamin D deficiency  -     Vitamin D,25-Hydroxy    7. Visit for screening mammogram    8. Macromastia    Other orders  -     cetirizine (ZyrTEC Allergy) 10 MG tablet; Take 1 tablet by mouth Daily.  Dispense: 90 tablet; Refill: 3  -     lisinopril (PRINIVIL,ZESTRIL) 10 MG tablet; Take 1 tablet by mouth Daily.  Dispense: 90 tablet; Refill: 3  -     valACYclovir (VALTREX) 500 MG tablet; Take 1 tablet by mouth Daily.  Dispense: 90 tablet; Refill: 3  -     fluticasone (Flonase) 50 MCG/ACT nasal spray; 2 sprays into the nostril(s) as directed by provider Daily.  Dispense: 48 g; Refill: 3  -     montelukast (Singulair) 10 MG tablet; Take 1 tablet by mouth Every Night.  Dispense: 90 tablet; Refill: 3  -     rosuvastatin (Crestor) 5 MG tablet; Take 1 tablet by mouth Daily.  Dispense: 90 tablet; Refill: 1    Plan as documented above.  I did discuss with patient seeing her back in 6 months.  Plan to get labs drawn today.  Further recommendations to follow once results return.  Plan to continue current management for hypertension.  Atorvastatin has been switched to rosuvastatin.  She will be due for mammogram again in February.  I will hold off on ordering at this time but will consider it again at her follow-up appointment in 6 months.      Medications Discontinued During This Encounter   Medication Reason    atorvastatin (LIPITOR) 40 MG tablet     cetirizine (ZyrTEC Allergy) 10 MG tablet Reorder    lisinopril (PRINIVIL,ZESTRIL) 10 MG tablet Reorder    valACYclovir (VALTREX) 500 MG tablet Reorder    fluticasone (Flonase) 50 MCG/ACT nasal spray Reorder          Follow Up   Return in about 6 months (around 12/10/2024) for Hypertension.  Patient was given instructions and counseling regarding her  condition or for health maintenance advice. Please see specific information pulled into the AVS if appropriate.       Osmani Mays DO  06/10/24  10:26 EDT

## 2024-06-11 DIAGNOSIS — R79.89 ELEVATED LFTS: Primary | ICD-10-CM

## 2024-08-07 ENCOUNTER — HOSPITAL ENCOUNTER (OUTPATIENT)
Dept: ULTRASOUND IMAGING | Facility: HOSPITAL | Age: 59
Discharge: HOME | End: 2024-08-07
Admitting: FAMILY MEDICINE
Payer: OTHER GOVERNMENT

## 2024-08-07 DIAGNOSIS — R79.89 ELEVATED LFTS: ICD-10-CM

## 2024-08-07 PROCEDURE — 76705 ECHO EXAM OF ABDOMEN: CPT

## 2024-09-24 RX ORDER — CIMETIDINE 400 MG
TABLET ORAL
Qty: 90 TABLET | Refills: 3 | Status: SHIPPED | OUTPATIENT
Start: 2024-09-24

## 2024-12-16 RX ORDER — CIMETIDINE 400 MG
TABLET ORAL
Qty: 90 TABLET | Refills: 3 | Status: SHIPPED | OUTPATIENT
Start: 2024-12-16

## 2024-12-16 RX ORDER — ROSUVASTATIN CALCIUM 5 MG/1
5 TABLET, COATED ORAL DAILY
Qty: 90 TABLET | Refills: 1 | Status: SHIPPED | OUTPATIENT
Start: 2024-12-16

## 2025-02-06 ENCOUNTER — OFFICE VISIT (OUTPATIENT)
Dept: FAMILY MEDICINE CLINIC | Facility: CLINIC | Age: 60
End: 2025-02-06
Payer: OTHER GOVERNMENT

## 2025-02-06 VITALS
DIASTOLIC BLOOD PRESSURE: 86 MMHG | HEIGHT: 60 IN | TEMPERATURE: 98.3 F | OXYGEN SATURATION: 97 % | SYSTOLIC BLOOD PRESSURE: 132 MMHG | BODY MASS INDEX: 32.86 KG/M2 | WEIGHT: 167.4 LBS | HEART RATE: 113 BPM

## 2025-02-06 DIAGNOSIS — R22.32 MASS OF LEFT AXILLA: Primary | ICD-10-CM

## 2025-02-06 DIAGNOSIS — R00.0 TACHYCARDIA: ICD-10-CM

## 2025-02-06 PROCEDURE — 99213 OFFICE O/P EST LOW 20 MIN: CPT

## 2025-02-06 NOTE — PROGRESS NOTES
Chief Complaint  Chief Complaint   Patient presents with    Mass     Lump under left armpit x 2.5 months       Subjective      Neda Bah presents to [facility]  History of Present Illness  The patient is a 59-year-old female who presents today with complaints of a lump under her left armpit. She first noticed this 2 to 3 months ago.    She reports the presence of a palpable mass in her left axillary region, which she first observed approximately 2.5 months ago. The mass has remained consistent in size and is not associated with any discomfort or pain. She has no history of any drainage from the area. She has been compliant with her mammography screenings, with the most recent one conducted at [facility]. Additionally, she mentions that she has been considering breast reduction surgery and has undergone a subsequent mammogram for clearance purposes.    Supplemental Information  She has fatty liver.      Objective     Medical History:  Past Medical History:   Diagnosis Date    GERD (gastroesophageal reflux disease)     HSV (herpes simplex virus) infection     Hyperlipidemia     Hypertension      Past Surgical History:   Procedure Laterality Date    EYE SURGERY      HYSTERECTOMY      TONSILLECTOMY        Social History     Tobacco Use    Smoking status: Former     Current packs/day: 0.00     Average packs/day: 1 pack/day for 34.2 years (34.2 ttl pk-yrs)     Types: Cigarettes     Start date: 1981     Quit date: 2015     Years since quittin.8     Passive exposure: Past    Smokeless tobacco: Never   Vaping Use    Vaping status: Never Used   Substance Use Topics    Alcohol use: Yes     Alcohol/week: 2.0 standard drinks of alcohol     Types: 2 Glasses of wine per week    Drug use: Never     History reviewed. No pertinent family history.    Medications:  Prior to Admission medications    Medication Sig Start Date End Date Taking? Authorizing Provider   cetirizine (ZyrTE  "Allergy) 10 MG tablet Take 1 tablet by mouth Daily. 6/10/24  Yes Osmani Mays DO   cimetidine (TAGAMET) 400 MG tablet Take 1 PO daily 12/16/24  Yes Osmani Mays DO   fluticasone (Flonase) 50 MCG/ACT nasal spray 2 sprays into the nostril(s) as directed by provider Daily. 6/10/24  Yes Osmani Mays DO   lisinopril (PRINIVIL,ZESTRIL) 10 MG tablet Take 1 tablet by mouth Daily. 6/10/24  Yes Osmani Mays DO   montelukast (Singulair) 10 MG tablet Take 1 tablet by mouth Every Night. 6/10/24  Yes Osmani Mays DO   rosuvastatin (Crestor) 5 MG tablet Take 1 tablet by mouth Daily. 12/16/24  Yes Osmani Mays DO   valACYclovir (VALTREX) 500 MG tablet Take 1 tablet by mouth Daily. 6/10/24  Yes Osmani Mays DO   nystatin (MYCOSTATIN) [id number] UNIT/GM cream Apply 1 application  topically to the appropriate area as directed 2 (Two) Times a Day. 12/7/23 2/6/25 Yes Osmani Mays DO        Allergies:   Patient has no known allergies.    Health Maintenance Due   Topic Date Due    ZOSTER VACCINE (2 of 2) 09/16/2019    ANNUAL PHYSICAL  Never done    BMI FOLLOWUP  07/20/2024    COVID-19 Vaccine (3 - 2024-25 season) 09/01/2024         Vital Signs:   /86 (BP Location: Left arm, Patient Position: Sitting, Cuff Size: Adult)   Pulse 113   Temp 98.3 °F (36.8 °C) (Oral)   Ht 152.4 cm (60\")   Wt 75.9 kg (167 lb 6.4 oz)   SpO2 97%   BMI 32.69 kg/m²     Wt Readings from Last 3 Encounters:   02/06/25 75.9 kg (167 lb 6.4 oz)   06/10/24 79.1 kg (174 lb 6.4 oz)   12/07/23 80.5 kg (177 lb 6.4 oz)     BP Readings from Last 3 Encounters:   02/06/25 132/86   06/10/24 142/82   12/07/23 124/82              Physical Exam  Vitals reviewed.   Constitutional:       Appearance: Normal appearance. She is well-developed.   HENT:      Head: Normocephalic and atraumatic.   Eyes:      Conjunctiva/sclera: Conjunctivae normal.      Pupils: Pupils are equal, round, and reactive to light.   Cardiovascular:      Rate " and Rhythm: Normal rate and regular rhythm. Tachycardia present.      Heart sounds: No murmur heard.     No friction rub. No gallop.   Pulmonary:      Effort: Pulmonary effort is normal.      Breath sounds: Normal breath sounds. No wheezing or rhonchi.   Chest:          Comments: Soft, non-mobile mass to left axilla; no erythema or pain on palpation  Skin:     General: Skin is warm and dry.   Neurological:      Mental Status: She is alert and oriented to person, place, and time.      Cranial Nerves: No cranial nerve deficit.   Psychiatric:         Mood and Affect: Mood and affect normal.         Behavior: Behavior normal.         Thought Content: Thought content normal.         Judgment: Judgment normal.       Physical Exam  Vital Signs  Heart rate was 113 and it is around 100 now. Blood pressure is normal.      Result Review :    The following data was reviewed by KRISTY Steen on 02/06/25 at 13:46 EST:        No Images in the past 120 days found..    Results                 Assessment and Plan    Diagnoses and all orders for this visit:    1. Mass of left axilla (Primary)  -     US Axilla Left; Future    2. Tachycardia       Assessment & Plan  1. Left axillary lump.  The differential diagnosis includes a lipoma or an enlarged lymph node. The patient reports that the lump has been present for 2 to 3 months, is not painful, and has not changed in size. An ultrasound has been ordered to further evaluate the nature of the lump. She will be contacted with the details regarding the location and timing of the procedure. She has been advised to rest and monitor her heart rate, which was elevated during the visit. She has also been instructed to report any increase in size or onset of pain in the lump.          Smoking Cessation:    Neda Bah  reports that she quit smoking about 9 years ago. Her smoking use included cigarettes. She started smoking about 44 years ago. She has a 34.2 pack-year smoking  history. She has been exposed to tobacco smoke. She has never used smokeless tobacco.        Follow Up   Return if symptoms worsen or fail to improve.  Patient was given instructions and counseling regarding her condition or for health maintenance advice. Please see specific information pulled into the AVS if appropriate.     Please note that portions of this note were completed with a voice recognition program.    Patient or patient representative verbalized consent for the use of Ambient Listening during the visit with  KRISTY Steen for chart documentation. 2/6/2025  13:45 EST

## 2025-02-21 ENCOUNTER — HOSPITAL ENCOUNTER (OUTPATIENT)
Dept: ULTRASOUND IMAGING | Facility: HOSPITAL | Age: 60
Discharge: HOME | End: 2025-02-21
Payer: OTHER GOVERNMENT

## 2025-02-21 DIAGNOSIS — R22.32 MASS OF LEFT AXILLA: ICD-10-CM

## 2025-02-21 PROCEDURE — 76882 US LMTD JT/FCL EVL NVASC XTR: CPT

## 2025-06-10 ENCOUNTER — OFFICE VISIT (OUTPATIENT)
Dept: FAMILY MEDICINE CLINIC | Facility: CLINIC | Age: 60
End: 2025-06-10
Payer: OTHER GOVERNMENT

## 2025-06-10 VITALS
BODY MASS INDEX: 32.39 KG/M2 | HEART RATE: 104 BPM | HEIGHT: 60 IN | OXYGEN SATURATION: 96 % | DIASTOLIC BLOOD PRESSURE: 86 MMHG | TEMPERATURE: 98.3 F | WEIGHT: 165 LBS | SYSTOLIC BLOOD PRESSURE: 118 MMHG

## 2025-06-10 DIAGNOSIS — Z00.00 ANNUAL PHYSICAL EXAM: Primary | ICD-10-CM

## 2025-06-10 DIAGNOSIS — R22.32 MASS OF LEFT AXILLA: ICD-10-CM

## 2025-06-10 DIAGNOSIS — E78.2 MIXED HYPERLIPIDEMIA: ICD-10-CM

## 2025-06-10 DIAGNOSIS — B00.9 HERPES: ICD-10-CM

## 2025-06-10 DIAGNOSIS — N63.21 MASS OF UPPER OUTER QUADRANT OF LEFT BREAST: ICD-10-CM

## 2025-06-10 DIAGNOSIS — E55.9 VITAMIN D DEFICIENCY: ICD-10-CM

## 2025-06-10 DIAGNOSIS — B07.9 WART OF HAND: ICD-10-CM

## 2025-06-10 DIAGNOSIS — K76.0 FATTY LIVER: ICD-10-CM

## 2025-06-10 DIAGNOSIS — L98.9 SKIN LESION: ICD-10-CM

## 2025-06-10 DIAGNOSIS — Z51.81 MEDICATION MONITORING ENCOUNTER: ICD-10-CM

## 2025-06-10 DIAGNOSIS — K21.9 GASTROESOPHAGEAL REFLUX DISEASE, UNSPECIFIED WHETHER ESOPHAGITIS PRESENT: ICD-10-CM

## 2025-06-10 DIAGNOSIS — H02.823 MILIA OF EYELID OF RIGHT EYE: ICD-10-CM

## 2025-06-10 DIAGNOSIS — I10 PRIMARY HYPERTENSION: ICD-10-CM

## 2025-06-10 DIAGNOSIS — E83.42 HYPOMAGNESEMIA: ICD-10-CM

## 2025-06-10 LAB
25(OH)D3 SERPL-MCNC: 95.7 NG/ML (ref 30–100)
ALBUMIN SERPL-MCNC: 4.9 G/DL (ref 3.5–5.2)
ALBUMIN/GLOB SERPL: 1.8 G/DL
ALP SERPL-CCNC: 114 U/L (ref 39–117)
ALT SERPL W P-5'-P-CCNC: 64 U/L (ref 1–33)
ANION GAP SERPL CALCULATED.3IONS-SCNC: 13.2 MMOL/L (ref 5–15)
AST SERPL-CCNC: 71 U/L (ref 1–32)
BASOPHILS # BLD AUTO: 0.04 10*3/MM3 (ref 0–0.2)
BASOPHILS NFR BLD AUTO: 0.6 % (ref 0–1.5)
BILIRUB SERPL-MCNC: 1 MG/DL (ref 0–1.2)
BUN SERPL-MCNC: 8 MG/DL (ref 6–20)
BUN/CREAT SERPL: 9.9 (ref 7–25)
CALCIUM SPEC-SCNC: 10.4 MG/DL (ref 8.6–10.5)
CHLORIDE SERPL-SCNC: 96 MMOL/L (ref 98–107)
CHOLEST SERPL-MCNC: 167 MG/DL (ref 0–200)
CO2 SERPL-SCNC: 26.8 MMOL/L (ref 22–29)
CREAT SERPL-MCNC: 0.81 MG/DL (ref 0.57–1)
DEPRECATED RDW RBC AUTO: 43.4 FL (ref 37–54)
EGFRCR SERPLBLD CKD-EPI 2021: 84.3 ML/MIN/1.73
EOSINOPHIL # BLD AUTO: 0.06 10*3/MM3 (ref 0–0.4)
EOSINOPHIL NFR BLD AUTO: 0.9 % (ref 0.3–6.2)
ERYTHROCYTE [DISTWIDTH] IN BLOOD BY AUTOMATED COUNT: 12.5 % (ref 12.3–15.4)
GLOBULIN UR ELPH-MCNC: 2.8 GM/DL
GLUCOSE SERPL-MCNC: 173 MG/DL (ref 65–99)
HCT VFR BLD AUTO: 45.3 % (ref 34–46.6)
HDLC SERPL-MCNC: 34 MG/DL (ref 40–60)
HGB BLD-MCNC: 14.9 G/DL (ref 12–15.9)
IMM GRANULOCYTES # BLD AUTO: 0.03 10*3/MM3 (ref 0–0.05)
IMM GRANULOCYTES NFR BLD AUTO: 0.5 % (ref 0–0.5)
LDLC SERPL CALC-MCNC: 90 MG/DL (ref 0–100)
LDLC/HDLC SERPL: 2.41 {RATIO}
LYMPHOCYTES # BLD AUTO: 1.58 10*3/MM3 (ref 0.7–3.1)
LYMPHOCYTES NFR BLD AUTO: 24.4 % (ref 19.6–45.3)
MAGNESIUM SERPL-MCNC: 1.7 MG/DL (ref 1.6–2.6)
MCH RBC QN AUTO: 31 PG (ref 26.6–33)
MCHC RBC AUTO-ENTMCNC: 32.9 G/DL (ref 31.5–35.7)
MCV RBC AUTO: 94.2 FL (ref 79–97)
MONOCYTES # BLD AUTO: 0.54 10*3/MM3 (ref 0.1–0.9)
MONOCYTES NFR BLD AUTO: 8.3 % (ref 5–12)
NEUTROPHILS NFR BLD AUTO: 4.23 10*3/MM3 (ref 1.7–7)
NEUTROPHILS NFR BLD AUTO: 65.3 % (ref 42.7–76)
NRBC BLD AUTO-RTO: 0 /100 WBC (ref 0–0.2)
PLATELET # BLD AUTO: 166 10*3/MM3 (ref 140–450)
PMV BLD AUTO: 10.9 FL (ref 6–12)
POTASSIUM SERPL-SCNC: 4.4 MMOL/L (ref 3.5–5.2)
PROT SERPL-MCNC: 7.7 G/DL (ref 6–8.5)
RBC # BLD AUTO: 4.81 10*6/MM3 (ref 3.77–5.28)
SODIUM SERPL-SCNC: 136 MMOL/L (ref 136–145)
TRIGL SERPL-MCNC: 255 MG/DL (ref 0–150)
VLDLC SERPL-MCNC: 43 MG/DL (ref 5–40)
WBC NRBC COR # BLD AUTO: 6.48 10*3/MM3 (ref 3.4–10.8)

## 2025-06-10 PROCEDURE — 85025 COMPLETE CBC W/AUTO DIFF WBC: CPT | Performed by: FAMILY MEDICINE

## 2025-06-10 PROCEDURE — 80053 COMPREHEN METABOLIC PANEL: CPT | Performed by: FAMILY MEDICINE

## 2025-06-10 PROCEDURE — 83036 HEMOGLOBIN GLYCOSYLATED A1C: CPT | Performed by: FAMILY MEDICINE

## 2025-06-10 PROCEDURE — 80061 LIPID PANEL: CPT | Performed by: FAMILY MEDICINE

## 2025-06-10 PROCEDURE — 83735 ASSAY OF MAGNESIUM: CPT | Performed by: FAMILY MEDICINE

## 2025-06-10 PROCEDURE — 82306 VITAMIN D 25 HYDROXY: CPT | Performed by: FAMILY MEDICINE

## 2025-06-10 RX ORDER — CETIRIZINE HYDROCHLORIDE 10 MG/1
10 TABLET ORAL DAILY
Qty: 90 TABLET | Refills: 3 | Status: SHIPPED | OUTPATIENT
Start: 2025-06-10

## 2025-06-10 RX ORDER — VALACYCLOVIR HYDROCHLORIDE 1 G/1
1000 TABLET, FILM COATED ORAL DAILY
Qty: 90 TABLET | Refills: 3 | Status: SHIPPED | OUTPATIENT
Start: 2025-06-10

## 2025-06-10 RX ORDER — TRETINOIN 0.25 MG/G
1 CREAM TOPICAL NIGHTLY
Qty: 45 G | Refills: 1 | Status: SHIPPED | OUTPATIENT
Start: 2025-06-10

## 2025-06-10 RX ORDER — LISINOPRIL 10 MG/1
10 TABLET ORAL DAILY
Qty: 90 TABLET | Refills: 3 | Status: SHIPPED | OUTPATIENT
Start: 2025-06-10

## 2025-06-10 RX ORDER — CIMETIDINE 400 MG
TABLET ORAL
Qty: 90 TABLET | Refills: 3 | Status: SHIPPED | OUTPATIENT
Start: 2025-06-10

## 2025-06-10 RX ORDER — ROSUVASTATIN CALCIUM 5 MG/1
5 TABLET, COATED ORAL DAILY
Qty: 90 TABLET | Refills: 3 | Status: SHIPPED | OUTPATIENT
Start: 2025-06-10

## 2025-06-10 NOTE — PROGRESS NOTES
Chief Complaint  Annual Exam    Subjective          Neda Bah presents to [facility]    History of Present Illness     History of Present Illness  The patient is a 59-year-old female who presents for follow-up.    She reports experiencing recurrent episodes of burning sensation in her right eye, reminiscent of previous herpes outbreaks. She has not observed any associated rash. She is currently on a daily regimen of Valtrex 500 mg, the duration of which she does not recall. The patient has a history of recurrent herpes issues around the right eye, previously discussed in 2024 and 2020.    She has a history of eyelid surgery, during which a hematoma was identified and subsequently sutured. She has noticed persistent bumps on her eye, which have not resolved. Additionally, she reports similar bumps on her cheek.    She also reports a wart on her finger that has not responded to over-the-counter treatments. She has a past history of warts during her youth.    She has been diagnosed with a lipoma in her left armpit, which is currently asymptomatic.    She is due for her next mammogram, with the last one performed on 01/31/2024.    She is taking Crestor for high cholesterol. She is taking lisinopril 10 mg daily for blood pressure management. She is taking Zyrtec for allergies. She is taking cimetidine 400 mg for acid reflux.    PAST SURGICAL HISTORY:  - Eyelid surgery with hematoma repair  -          Current Outpatient Medications   Medication Instructions   • cetirizine (ZYRTEC ALLERGY) 10 mg, Oral, Daily   • cimetidine (TAGAMET) 400 MG tablet Take 1 PO daily   • fluticasone (Flonase) 50 MCG/ACT nasal spray 2 sprays, Nasal, Daily   • lisinopril (PRINIVIL,ZESTRIL) 10 mg, Oral, Daily   • montelukast (SINGULAIR) 10 mg, Oral, Nightly   • rosuvastatin (CRESTOR) 5 mg, Oral, Daily   • tretinoin (Retin-A) 0.025 % cream 1 Application, Topical, Nightly   • valACYclovir (VALTREX) 1,000 mg,  "Oral, Daily       The following portions of the patient's history were reviewed and updated as appropriate: allergies, current medications, past family history, past medical history, past social history, past surgical history, and problem list.    Objective   Vital Signs:   /86   Pulse 104   Temp 98.3 °F (36.8 °C) (Oral)   Ht 152.4 cm (60\")   Wt 74.8 kg (165 lb)   SpO2 96%   BMI 32.22 kg/m²     BP Readings from Last 3 Encounters:   06/10/25 118/86   02/06/25 132/86   06/10/24 142/82     Wt Readings from Last 3 Encounters:   06/10/25 74.8 kg (165 lb)   02/06/25 75.9 kg (167 lb 6.4 oz)   06/10/24 79.1 kg (174 lb 6.4 oz)     BMI is >= 30 and <35. (Class 1 Obesity). The following options were offered after discussion;: exercise counseling/recommendations and nutrition counseling/recommendations     Physical Exam  Vitals reviewed.   Constitutional:       Appearance: Normal appearance.   HENT:      Head: Normocephalic and atraumatic.      Right Ear: External ear normal.      Left Ear: External ear normal.      Nose: Nose normal.   Eyes:      Conjunctiva/sclera: Conjunctivae normal.   Cardiovascular:      Rate and Rhythm: Normal rate and regular rhythm.      Heart sounds: No murmur heard.     No friction rub. No gallop.   Pulmonary:      Effort: Pulmonary effort is normal.      Breath sounds: Normal breath sounds. No wheezing or rhonchi.   Abdominal:      General: Bowel sounds are normal. There is no distension.      Palpations: Abdomen is soft.      Tenderness: There is no abdominal tenderness.   Skin:     Comments: Patient has a couple milia noted in the right upper eyelid region on the medial aspect.  There is also a small skin colored lesion noted on the left maxillary region that measures about 3 mm in diameter.  Slightly raised.  Nontender to palpation.  There is a small wart noted on the index finger of the left hand on the lateral aspect.   Neurological:      Mental Status: She is alert and oriented to " person, place, and time.      Cranial Nerves: No cranial nerve deficit.   Psychiatric:         Mood and Affect: Mood and affect normal.         Behavior: Behavior normal.         Thought Content: Thought content normal.         Judgment: Judgment normal.          Result Review :   The following data was reviewed by: Osmani Mays DO on 06/10/2025:           Lab Results   Component Value Date    SARSANTIGEN Not Detected 05/17/2022    RAPFLUA Negative 05/17/2022    RAPFLUB Negative 05/17/2022    RAPSCRN Negative 05/17/2022       Results  Labs   - Liver enzyme test: Slightly elevated    Imaging   - Ultrasound of the liver: Fatty liver   - Ultrasound of the left armpit: No mass or adenopathy    Cryotherapy, Skin Lesion    Date/Time: 6/10/2025 11:21 AM    Performed by: Osmani Mays DO  Authorized by: Osmani Mays DO  Preparation: Patient was prepped and draped in the usual sterile fashion.  Patient tolerance: patient tolerated the procedure well with no immediate complications  Comments: Patient provided verbal consent for treatment of wart with liquid nitrogen.            Assessment and Plan    Diagnoses and all orders for this visit:    1. Annual physical exam (Primary)    2. Herpes    3. Mixed hyperlipidemia  -     Lipid Panel    4. Primary hypertension  -     CBC & Differential  -     Comprehensive Metabolic Panel    5. Gastroesophageal reflux disease, unspecified whether esophagitis present    6. Skin lesion    7. Wart of hand, left index finger  -     Cryotherapy, Skin Lesion    8. Milia of eyelid of right eye    9. Hypomagnesemia  -     Magnesium    10. Medication monitoring encounter    11. Vitamin D deficiency  -     Vitamin D,25-Hydroxy    12. Fatty liver    13. Mass of left axilla  -     Mammo Diagnostic Digital Tomosynthesis Bilateral With CAD; Future  -     US Breast Left Limited; Future    14. Mass of upper outer quadrant of left breast  -     Mammo Diagnostic Digital Tomosynthesis Bilateral  With CAD; Future  -     US Breast Left Limited; Future    Other orders  -     rosuvastatin (Crestor) 5 MG tablet; Take 1 tablet by mouth Daily.  Dispense: 90 tablet; Refill: 3  -     valACYclovir (Valtrex) 1000 MG tablet; Take 1 tablet by mouth Daily.  Dispense: 90 tablet; Refill: 3  -     lisinopril (PRINIVIL,ZESTRIL) 10 MG tablet; Take 1 tablet by mouth Daily.  Dispense: 90 tablet; Refill: 3  -     cetirizine (ZyrTEC Allergy) 10 MG tablet; Take 1 tablet by mouth Daily.  Dispense: 90 tablet; Refill: 3  -     cimetidine (TAGAMET) 400 MG tablet; Take 1 PO daily  Dispense: 90 tablet; Refill: 3  -     tretinoin (Retin-A) 0.025 % cream; Apply 1 Application topically to the appropriate area as directed Every Night.  Dispense: 45 g; Refill: 1        Assessment & Plan  1. Herpes Simplex Virus.  - Reports recurrent issues with herpes around the right eye, with symptoms including burning sensations.  - Increased pain and limited mobility.  - Dosage of Valtrex will be increased to 1 g daily.  - A new prescription for Valtrex will be sent to the pharmacy.    2. Milia.  - Presents with small, hard bumps on her eyelids, consistent with milia.  - Physical exam findings reveal tiny white, yellowish hard bumps.  - Referral to dermatology will be made for further evaluation.  - Prescribed Retin-A cream to be applied once nightly at bedtime.    3. Wart.  - Has a wart on her finger that has not responded to over-the-counter treatments.  - Physical exam findings confirm the presence of a wart.  - Referral to dermatology will be made for further evaluation and potential treatment.  - Discussed the option of freezing the wart today but deferred due to time constraints.    4. Lipoma.  - Reports a lump sensation in the left axillary tail region, suspected to be a lipoma.  - Previous ultrasound showed no mass or adenopathy.  - Diagnostic mammogram and ultrasound will be ordered to further evaluate the area.  - Discussed the possibility of  surgical removal if further issues arise.    5. Medication Management.  - Needs refills for several medications.  - Prescriptions for Crestor, lisinopril 10 mg daily, cetirizine, and cimetidine 400 mg will be renewed.  - Blood pressure reported as stable.    6. Health Maintenance.  - Last mammogram was done on 01/31/2024.  - Diagnostic mammogram and ultrasound will be ordered to further evaluate the area.  - Discussed the importance of regular health maintenance and follow-up.       Medications Discontinued During This Encounter   Medication Reason   • valACYclovir (VALTREX) 500 MG tablet    • cetirizine (ZyrTEC Allergy) 10 MG tablet Reorder   • lisinopril (PRINIVIL,ZESTRIL) 10 MG tablet Reorder   • rosuvastatin (Crestor) 5 MG tablet Reorder   • cimetidine (TAGAMET) 400 MG tablet Reorder          Follow Up   Return in about 1 year (around 6/10/2026) for Hypertension.  Patient was given instructions and counseling regarding her condition or for health maintenance advice. Please see specific information pulled into the AVS if appropriate.     Patient or patient representative verbalized consent for the use of Ambient Listening during the visit with  Osmani Mays DO for chart documentation. 6/10/2025  11:21 EDT    Osmani Mays DO  06/10/25  11:22 EDT